# Patient Record
Sex: FEMALE | Race: WHITE | NOT HISPANIC OR LATINO
[De-identification: names, ages, dates, MRNs, and addresses within clinical notes are randomized per-mention and may not be internally consistent; named-entity substitution may affect disease eponyms.]

---

## 2017-01-09 ENCOUNTER — APPOINTMENT (OUTPATIENT)
Dept: SURGERY | Facility: CLINIC | Age: 57
End: 2017-01-09

## 2017-01-09 VITALS
DIASTOLIC BLOOD PRESSURE: 79 MMHG | HEART RATE: 81 BPM | HEIGHT: 68 IN | SYSTOLIC BLOOD PRESSURE: 128 MMHG | WEIGHT: 293 LBS | OXYGEN SATURATION: 95 % | BODY MASS INDEX: 44.41 KG/M2

## 2017-03-08 ENCOUNTER — RX RENEWAL (OUTPATIENT)
Age: 57
End: 2017-03-08

## 2017-03-16 ENCOUNTER — RX RENEWAL (OUTPATIENT)
Age: 57
End: 2017-03-16

## 2017-04-10 ENCOUNTER — APPOINTMENT (OUTPATIENT)
Dept: SURGERY | Facility: CLINIC | Age: 57
End: 2017-04-10

## 2017-04-10 VITALS
BODY MASS INDEX: 45.36 KG/M2 | SYSTOLIC BLOOD PRESSURE: 138 MMHG | WEIGHT: 289 LBS | HEIGHT: 67 IN | HEART RATE: 90 BPM | DIASTOLIC BLOOD PRESSURE: 80 MMHG | TEMPERATURE: 98.2 F | RESPIRATION RATE: 16 BRPM | OXYGEN SATURATION: 96 %

## 2017-06-12 ENCOUNTER — APPOINTMENT (OUTPATIENT)
Dept: PULMONOLOGY | Facility: CLINIC | Age: 57
End: 2017-06-12
Payer: COMMERCIAL

## 2017-06-12 VITALS
HEIGHT: 67 IN | SYSTOLIC BLOOD PRESSURE: 118 MMHG | RESPIRATION RATE: 18 BRPM | OXYGEN SATURATION: 87 % | DIASTOLIC BLOOD PRESSURE: 78 MMHG | BODY MASS INDEX: 44.1 KG/M2 | HEART RATE: 75 BPM | WEIGHT: 281 LBS

## 2017-06-12 PROCEDURE — 99215 OFFICE O/P EST HI 40 MIN: CPT

## 2017-09-25 ENCOUNTER — APPOINTMENT (OUTPATIENT)
Dept: PULMONOLOGY | Facility: CLINIC | Age: 57
End: 2017-09-25
Payer: COMMERCIAL

## 2017-09-25 VITALS
RESPIRATION RATE: 18 BRPM | OXYGEN SATURATION: 93 % | HEART RATE: 74 BPM | BODY MASS INDEX: 41.91 KG/M2 | SYSTOLIC BLOOD PRESSURE: 118 MMHG | HEIGHT: 67 IN | WEIGHT: 267 LBS | DIASTOLIC BLOOD PRESSURE: 80 MMHG

## 2017-09-25 PROCEDURE — 99214 OFFICE O/P EST MOD 30 MIN: CPT

## 2018-03-26 ENCOUNTER — APPOINTMENT (OUTPATIENT)
Dept: PULMONOLOGY | Facility: CLINIC | Age: 58
End: 2018-03-26

## 2019-07-26 ENCOUNTER — APPOINTMENT (OUTPATIENT)
Dept: PULMONOLOGY | Facility: CLINIC | Age: 59
End: 2019-07-26

## 2019-09-25 ENCOUNTER — APPOINTMENT (OUTPATIENT)
Dept: PULMONOLOGY | Facility: CLINIC | Age: 59
End: 2019-09-25
Payer: COMMERCIAL

## 2019-09-25 VITALS
WEIGHT: 293 LBS | BODY MASS INDEX: 46.36 KG/M2 | OXYGEN SATURATION: 86 % | DIASTOLIC BLOOD PRESSURE: 88 MMHG | TEMPERATURE: 98.3 F | HEART RATE: 94 BPM | RESPIRATION RATE: 16 BRPM | SYSTOLIC BLOOD PRESSURE: 122 MMHG

## 2019-09-25 VITALS — OXYGEN SATURATION: 95 %

## 2019-09-25 PROCEDURE — 90686 IIV4 VACC NO PRSV 0.5 ML IM: CPT

## 2019-09-25 PROCEDURE — 94729 DIFFUSING CAPACITY: CPT

## 2019-09-25 PROCEDURE — 99215 OFFICE O/P EST HI 40 MIN: CPT | Mod: 25

## 2019-09-25 PROCEDURE — G0008: CPT

## 2019-09-25 PROCEDURE — 94060 EVALUATION OF WHEEZING: CPT

## 2019-09-25 PROCEDURE — 94727 GAS DIL/WSHOT DETER LNG VOL: CPT

## 2019-09-25 NOTE — HISTORY OF PRESENT ILLNESS
[FreeTextEntry1] : She came for a follow up today. No cough, wheezing or shortness of breath at rest. \par \par She feels congested at times. She is not using any inhalers. \par \par Has not smoked since 2012. \par \par

## 2019-09-25 NOTE — REVIEW OF SYSTEMS
[Hypertension] : ~T hypertension [Snoring] : snoring [Fatigue] : no fatigue [Fever] : no fever [Poor Appetite] : normal appetite  [Nasal Congestion] : no nasal congestion [Ear Disturbance] : no ear disturbance [Epistaxis] : no nosebleeds [Cough] : no cough [Postnasal Drip] : no postnasal drip [Sputum] : not coughing up ~M sputum [Dyspnea] : no dyspnea [Chest Discomfort] : no chest discomfort [PND] : no PND [Heartburn] : no heartburn [Reflux] : no reflux [Diarrhea] : no diarrhea [Depression] : no depression [Dysuria] : no dysuria [Hypersomnolence] : not sleeping much more than usual [Difficulty Initiating Sleep] : no difficulty falling asleep

## 2019-09-25 NOTE — PHYSICAL EXAM
[General Appearance - In No Acute Distress] : no acute distress [Normal Conjunctiva] : the conjunctiva exhibited no abnormalities [III] : III [Jugular Venous Distention Increased] : there was no jugular-venous distention [Heart Sounds] : normal S1 and S2 [Auscultation Breath Sounds / Voice Sounds] : lungs were clear to auscultation bilaterally [Bowel Sounds] : normal bowel sounds [Abnormal Walk] : normal gait [Nail Clubbing] : no clubbing of the fingernails [Cyanosis, Localized] : no localized cyanosis [Skin Turgor] : normal skin turgor [No Focal Deficits] : no focal deficits [] : no rash [Impaired Insight] : insight and judgment were intact [Oriented To Time, Place, And Person] : oriented to person, place, and time [FreeTextEntry1] : Abdomen remains obese

## 2019-09-25 NOTE — DISCUSSION/SUMMARY
[FreeTextEntry1] : 59 year-old woman with obesity, S/P gastric sleeve in the past, severe obstructive lung disease, restrictive lung disease and chronic ventilatory failure. She has been hospitalized in the past with hypercapnic respiratory failure. Polysomnography has demonstrated severe sleep-related desaturations which were rectified with non-invasive ventilation. She does not want to use nocturnal ventilation. \par \par Has not been able to lose weight and maintain it. Weight loss was again advised. \par \par Influenza vaccination given in the left arm. \par \par To continue with Symbicort and ProAir as needed. \par \par Chest x-ray requested. \par \par Advised to consider a sleep study. She will let my know. \par \par Follow up in three months.

## 2019-09-27 ENCOUNTER — TRANSCRIPTION ENCOUNTER (OUTPATIENT)
Age: 59
End: 2019-09-27

## 2020-03-25 ENCOUNTER — APPOINTMENT (OUTPATIENT)
Dept: PULMONOLOGY | Facility: CLINIC | Age: 60
End: 2020-03-25
Payer: COMMERCIAL

## 2020-03-25 VITALS
HEART RATE: 77 BPM | DIASTOLIC BLOOD PRESSURE: 98 MMHG | TEMPERATURE: 98.5 F | WEIGHT: 293 LBS | SYSTOLIC BLOOD PRESSURE: 148 MMHG | OXYGEN SATURATION: 90 % | BODY MASS INDEX: 47.46 KG/M2

## 2020-03-25 PROCEDURE — 99214 OFFICE O/P EST MOD 30 MIN: CPT

## 2020-03-25 NOTE — PHYSICAL EXAM
[III] : III [Jugular Venous Distention Increased] : there was no jugular-venous distention [Heart Sounds] : normal S1 and S2 [Auscultation Breath Sounds / Voice Sounds] : lungs were clear to auscultation bilaterally [Bowel Sounds] : normal bowel sounds [Abnormal Walk] : normal gait [Nail Clubbing] : no clubbing of the fingernails [Cyanosis, Localized] : no localized cyanosis [Skin Turgor] : normal skin turgor [] : no rash [No Focal Deficits] : no focal deficits [Oriented To Time, Place, And Person] : oriented to person, place, and time [Impaired Insight] : insight and judgment were intact [Well Groomed] : well groomed [FreeTextEntry1] : Obese [Erythema] : no erythema of the pharynx

## 2020-03-25 NOTE — DISCUSSION/SUMMARY
[FreeTextEntry1] : She is a 59 year-old woman with obesity, S/P gastric sleeve in the past, severe obstructive lung disease, restrictive lung disease and chronic ventilatory failure. She has been hospitalized in the past with hypercapnic respiratory failure. Polysomnography has demonstrated severe sleep-related desaturations which were rectified with non-invasive ventilation. She has not wanted to use nocturnal ventilation. Has not been able to lose weight and maintain it. Weight loss was again advised. \par \par Her COPD is stable. To continue with Symbicort and ProAir as needed. The medications were renewed. \par \par Former smoker. To consider LDCT. \par \par Follow up in 6 months.

## 2020-03-25 NOTE — REVIEW OF SYSTEMS
[Hypertension] : ~T hypertension [Snoring] : snoring [Fever] : no fever [Fatigue] : no fatigue [Poor Appetite] : normal appetite  [Ear Disturbance] : no ear disturbance [Nasal Congestion] : no nasal congestion [Epistaxis] : no nosebleeds [Postnasal Drip] : no postnasal drip [Cough] : no cough [Hemoptysis] : no hemoptysis [Dyspnea] : no dyspnea [Wheezing] : no wheezing [Chest Discomfort] : no chest discomfort [PND] : no PND [Heartburn] : no heartburn [Reflux] : no reflux [Diarrhea] : no diarrhea [Dysuria] : no dysuria [Depression] : no depression [Difficulty Initiating Sleep] : no difficulty falling asleep [Hypersomnolence] : not sleeping much more than usual

## 2020-03-25 NOTE — HISTORY OF PRESENT ILLNESS
[FreeTextEntry1] : She came for a follow up today. \par \par No complaint of a cough, wheezing or shortness of breath at rest. \par \par Has been sheltering in place. \par \par Has not smoked since 2012. \par

## 2020-03-25 NOTE — PROCEDURE
[FreeTextEntry1] : PFT 9/25/19: Severe obstruction noted. Mild response to bronchodilator. Mild restriction. Diffusion was moderately reduced.

## 2020-05-19 ENCOUNTER — RX RENEWAL (OUTPATIENT)
Age: 60
End: 2020-05-19

## 2020-09-30 ENCOUNTER — APPOINTMENT (OUTPATIENT)
Dept: PULMONOLOGY | Facility: CLINIC | Age: 60
End: 2020-09-30
Payer: COMMERCIAL

## 2020-09-30 VITALS
SYSTOLIC BLOOD PRESSURE: 134 MMHG | WEIGHT: 293 LBS | BODY MASS INDEX: 46.99 KG/M2 | OXYGEN SATURATION: 94 % | DIASTOLIC BLOOD PRESSURE: 94 MMHG | TEMPERATURE: 98.2 F | HEART RATE: 82 BPM

## 2020-09-30 PROCEDURE — 90686 IIV4 VACC NO PRSV 0.5 ML IM: CPT

## 2020-09-30 PROCEDURE — G0008: CPT

## 2020-09-30 PROCEDURE — 99214 OFFICE O/P EST MOD 30 MIN: CPT | Mod: 25

## 2020-09-30 NOTE — REVIEW OF SYSTEMS
[Hypertension] : ~T hypertension [Snoring] : snoring [Fever] : no fever [Chills] : no chills [Fatigue] : no fatigue [Poor Appetite] : normal appetite  [Ear Disturbance] : no ear disturbance [Nasal Congestion] : no nasal congestion [Epistaxis] : no nosebleeds [Postnasal Drip] : no postnasal drip [Cough] : no cough [Hemoptysis] : no hemoptysis [Dyspnea] : no dyspnea [Wheezing] : no wheezing [Chest Discomfort] : no chest discomfort [PND] : no PND [Heartburn] : no heartburn [Reflux] : no reflux [Diarrhea] : no diarrhea [Dysuria] : no dysuria [Depression] : no depression [Difficulty Initiating Sleep] : no difficulty falling asleep [Hypersomnolence] : not sleeping much more than usual

## 2020-09-30 NOTE — HISTORY OF PRESENT ILLNESS
[Former] : former [TextBox_4] : She came for a follow up today. \par \par No complaint of a cough, wheezing or shortness of breath at rest. \par \par Did not have LDCT as advised. \par \par Has not smoked since 2012. \par  [TextBox_13] : 2012

## 2020-09-30 NOTE — PHYSICAL EXAM
[Well Groomed] : well groomed [III] : III [Jugular Venous Distention Increased] : there was no jugular-venous distention [Heart Sounds] : normal S1 and S2 [Auscultation Breath Sounds / Voice Sounds] : lungs were clear to auscultation bilaterally [Abnormal Walk] : normal gait [Nail Clubbing] : no clubbing of the fingernails [Skin Turgor] : normal skin turgor [Oriented To Time, Place, And Person] : oriented to person, place, and time [Impaired Insight] : insight and judgment were intact [No Focal Deficits] : no focal deficits [] : no hepato-splenomegaly [FreeTextEntry1] : Obese [Erythema] : no erythema of the pharynx

## 2020-09-30 NOTE — DISCUSSION/SUMMARY
[FreeTextEntry1] : She is a 60 year-old woman with obesity, S/P gastric sleeve in the past, severe obstructive lung disease, restrictive lung disease and chronic ventilatory failure. She has been hospitalized in the past with hypercapnic respiratory failure. Polysomnography has demonstrated severe sleep-related desaturations which were rectified with non-invasive ventilation. She has not wanted to use nocturnal ventilation. Has not been able to lose weight and maintain it.\par \par Her COPD is stable. To continue with Symbicort and ProAir as needed.Former smoker. \par \par LDCT discussed. She will let me know if she wants it. \par \par High dose influenza vaccination given. \par \par Follow up with PCP regarding her blood pressure management. \par \par Follow up in six months. Sooner if necessary.

## 2020-10-26 DIAGNOSIS — Z23 ENCOUNTER FOR IMMUNIZATION: ICD-10-CM

## 2020-10-29 ENCOUNTER — MED ADMIN CHARGE (OUTPATIENT)
Age: 60
End: 2020-10-29

## 2021-03-14 ENCOUNTER — INPATIENT (INPATIENT)
Facility: HOSPITAL | Age: 61
LOS: 5 days | Discharge: HOME HEALTH | DRG: 177 | End: 2021-03-20
Attending: INTERNAL MEDICINE | Admitting: INTERNAL MEDICINE
Payer: COMMERCIAL

## 2021-03-14 VITALS
WEIGHT: 289.91 LBS | HEIGHT: 68 IN | TEMPERATURE: 99 F | HEART RATE: 83 BPM | OXYGEN SATURATION: 89 % | SYSTOLIC BLOOD PRESSURE: 115 MMHG | DIASTOLIC BLOOD PRESSURE: 75 MMHG | RESPIRATION RATE: 24 BRPM

## 2021-03-14 DIAGNOSIS — Z90.49 ACQUIRED ABSENCE OF OTHER SPECIFIED PARTS OF DIGESTIVE TRACT: Chronic | ICD-10-CM

## 2021-03-14 DIAGNOSIS — U07.1 COVID-19: ICD-10-CM

## 2021-03-14 DIAGNOSIS — Z90.89 ACQUIRED ABSENCE OF OTHER ORGANS: Chronic | ICD-10-CM

## 2021-03-14 LAB
ALBUMIN SERPL ELPH-MCNC: 3.3 G/DL — SIGNIFICANT CHANGE UP (ref 3.3–5)
ALP SERPL-CCNC: 60 U/L — SIGNIFICANT CHANGE UP (ref 40–120)
ALT FLD-CCNC: 11 U/L — SIGNIFICANT CHANGE UP (ref 10–45)
ANION GAP SERPL CALC-SCNC: 11 MMOL/L — SIGNIFICANT CHANGE UP (ref 5–17)
APTT BLD: 36.9 SEC — HIGH (ref 27.5–35.5)
AST SERPL-CCNC: 18 U/L — SIGNIFICANT CHANGE UP (ref 10–40)
BASOPHILS # BLD AUTO: 0.01 K/UL — SIGNIFICANT CHANGE UP (ref 0–0.2)
BASOPHILS NFR BLD AUTO: 0.2 % — SIGNIFICANT CHANGE UP (ref 0–2)
BILIRUB SERPL-MCNC: 0.2 MG/DL — SIGNIFICANT CHANGE UP (ref 0.2–1.2)
BUN SERPL-MCNC: 12 MG/DL — SIGNIFICANT CHANGE UP (ref 7–23)
CALCIUM SERPL-MCNC: 8.4 MG/DL — SIGNIFICANT CHANGE UP (ref 8.4–10.5)
CHLORIDE SERPL-SCNC: 99 MMOL/L — SIGNIFICANT CHANGE UP (ref 96–108)
CO2 SERPL-SCNC: 28 MMOL/L — SIGNIFICANT CHANGE UP (ref 22–31)
CREAT SERPL-MCNC: 0.7 MG/DL — SIGNIFICANT CHANGE UP (ref 0.5–1.3)
CRP SERPL-MCNC: 3.07 MG/DL — HIGH (ref 0–0.4)
D DIMER BLD IA.RAPID-MCNC: 383 NG/ML DDU — HIGH
EOSINOPHIL # BLD AUTO: 0 K/UL — SIGNIFICANT CHANGE UP (ref 0–0.5)
EOSINOPHIL NFR BLD AUTO: 0 % — SIGNIFICANT CHANGE UP (ref 0–6)
FERRITIN SERPL-MCNC: 62 NG/ML — SIGNIFICANT CHANGE UP (ref 15–150)
GLUCOSE SERPL-MCNC: 100 MG/DL — HIGH (ref 70–99)
HCT VFR BLD CALC: 41.4 % — SIGNIFICANT CHANGE UP (ref 34.5–45)
HGB BLD-MCNC: 13 G/DL — SIGNIFICANT CHANGE UP (ref 11.5–15.5)
IMM GRANULOCYTES NFR BLD AUTO: 0.2 % — SIGNIFICANT CHANGE UP (ref 0–1.5)
INR BLD: 1.15 RATIO — SIGNIFICANT CHANGE UP (ref 0.88–1.16)
LYMPHOCYTES # BLD AUTO: 1.43 K/UL — SIGNIFICANT CHANGE UP (ref 1–3.3)
LYMPHOCYTES # BLD AUTO: 27.9 % — SIGNIFICANT CHANGE UP (ref 13–44)
MCHC RBC-ENTMCNC: 28.1 PG — SIGNIFICANT CHANGE UP (ref 27–34)
MCHC RBC-ENTMCNC: 31.4 GM/DL — LOW (ref 32–36)
MCV RBC AUTO: 89.4 FL — SIGNIFICANT CHANGE UP (ref 80–100)
MONOCYTES # BLD AUTO: 0.44 K/UL — SIGNIFICANT CHANGE UP (ref 0–0.9)
MONOCYTES NFR BLD AUTO: 8.6 % — SIGNIFICANT CHANGE UP (ref 2–14)
NEUTROPHILS # BLD AUTO: 3.23 K/UL — SIGNIFICANT CHANGE UP (ref 1.8–7.4)
NEUTROPHILS NFR BLD AUTO: 63.1 % — SIGNIFICANT CHANGE UP (ref 43–77)
NRBC # BLD: 0 /100 WBCS — SIGNIFICANT CHANGE UP (ref 0–0)
PLATELET # BLD AUTO: 141 K/UL — LOW (ref 150–400)
POTASSIUM SERPL-MCNC: 4 MMOL/L — SIGNIFICANT CHANGE UP (ref 3.5–5.3)
POTASSIUM SERPL-SCNC: 4 MMOL/L — SIGNIFICANT CHANGE UP (ref 3.5–5.3)
PROCALCITONIN SERPL-MCNC: 0.13 NG/ML — HIGH (ref 0.02–0.1)
PROT SERPL-MCNC: 6.9 G/DL — SIGNIFICANT CHANGE UP (ref 6–8.3)
PROTHROM AB SERPL-ACNC: 13.7 SEC — HIGH (ref 10.6–13.6)
RBC # BLD: 4.63 M/UL — SIGNIFICANT CHANGE UP (ref 3.8–5.2)
RBC # FLD: 12.8 % — SIGNIFICANT CHANGE UP (ref 10.3–14.5)
SARS-COV-2 RNA SPEC QL NAA+PROBE: DETECTED
SODIUM SERPL-SCNC: 138 MMOL/L — SIGNIFICANT CHANGE UP (ref 135–145)
WBC # BLD: 5.12 K/UL — SIGNIFICANT CHANGE UP (ref 3.8–10.5)
WBC # FLD AUTO: 5.12 K/UL — SIGNIFICANT CHANGE UP (ref 3.8–10.5)

## 2021-03-14 PROCEDURE — 99285 EMERGENCY DEPT VISIT HI MDM: CPT

## 2021-03-14 PROCEDURE — 99221 1ST HOSP IP/OBS SF/LOW 40: CPT

## 2021-03-14 PROCEDURE — 71045 X-RAY EXAM CHEST 1 VIEW: CPT | Mod: 26

## 2021-03-14 RX ORDER — ACETAMINOPHEN 500 MG
975 TABLET ORAL ONCE
Refills: 0 | Status: COMPLETED | OUTPATIENT
Start: 2021-03-14 | End: 2021-03-14

## 2021-03-14 RX ORDER — DEXAMETHASONE 0.5 MG/5ML
6 ELIXIR ORAL ONCE
Refills: 0 | Status: COMPLETED | OUTPATIENT
Start: 2021-03-14 | End: 2021-03-14

## 2021-03-14 RX ADMIN — Medication 975 MILLIGRAM(S): at 19:00

## 2021-03-14 RX ADMIN — Medication 6 MILLIGRAM(S): at 17:43

## 2021-03-14 RX ADMIN — Medication 975 MILLIGRAM(S): at 18:38

## 2021-03-14 NOTE — ED ADULT NURSE NOTE - OBJECTIVE STATEMENT
60 y.o A&Ox3 female presents to ED c/o SOB, body aches, and chills x 2 days. PMH COPD not on home O2, obesity, DVT, former smoker. Per pt SOB worsened over the day prompting her to come to ED. 89% on room air, tachypneic with respirations in high 20s. Edis LUBIN aware. Lungs diminished throughout, currently on 6L NC with O 2 saturation of 100%. Denies CP, n/v/d, abdominal pain, urinary symptoms,  numbness, tingling in upper and lower extremities, HA, blurry vision. Updated on plan of care.

## 2021-03-14 NOTE — H&P ADULT - NSICDXFAMILYHX_GEN_ALL_CORE_FT
FAMILY HISTORY:  Father  Still living? Unknown  Family history of lung cancer, Age at diagnosis: Age Unknown    Mother  Still living? Unknown  Maternal family history of hypertension, Age at diagnosis: Age Unknown

## 2021-03-14 NOTE — H&P ADULT - NSICDXPASTMEDICALHX_GEN_ALL_CORE_FT
PAST MEDICAL HISTORY:  Cellulitis b/l LE    Coma of unknown cause 2014    COPD (chronic obstructive pulmonary disease) intubated x 1- 08/2014    Deep vein thrombosis (DVT) of other vein of left lower extremity 2014, 01/2016- on Xarelto x 9 months    Morbid obesity due to excess calories     RENUKA treated with BiPAP sleep studied on 09/2016    Thyroid nodule

## 2021-03-14 NOTE — H&P ADULT - PROBLEM SELECTOR PLAN 4
Given current illness will continue O2 via NC for now. If breathing worsens or hypoxia appear primarily nocturnal would consider bipap use qhs

## 2021-03-14 NOTE — H&P ADULT - ASSESSMENT
61yo F w/pmhx of COPD, multiple DVTs of the LLE previously on AC but now discontinued, Obesity, RENUKA on BiPAP, who now presents with 3 day hx worsening shortness of breath and hypoxemia in the setting of recent COVID19 diagnosis

## 2021-03-14 NOTE — H&P ADULT - NSHPLABSRESULTS_GEN_ALL_CORE
Labs personally reviewed:                        13.0   5.12  )-----------( 141      ( 14 Mar 2021 18:14 )             41.4     03-14    138  |  99  |  12  ----------------------------<  100<H>  4.0   |  28  |  0.70    Ca    8.4      14 Mar 2021 18:14    TPro  6.9  /  Alb  3.3  /  TBili  0.2  /  DBili  x   /  AST  18  /  ALT  11  /  AlkPhos  60  03-14        LIVER FUNCTIONS - ( 14 Mar 2021 18:14 )  Alb: 3.3 g/dL / Pro: 6.9 g/dL / ALK PHOS: 60 U/L / ALT: 11 U/L / AST: 18 U/L / GGT: x           PT/INR - ( 14 Mar 2021 18:14 )   PT: 13.7 sec;   INR: 1.15 ratio         PTT - ( 14 Mar 2021 18:14 )  PTT:36.9 sec    Imaging personally reviewed-pneumonia on cxr

## 2021-03-14 NOTE — ED PROVIDER NOTE - PHYSICAL EXAMINATION
Exam:  General: Patient well appearing, vital signs within normal limits  HEENT: airway patent with moist mucous membranes  Cardiac: RRR S1/S2 with strong peripheral pulses  Respiratory: lungs clear without respiratory distress  GI: abdomen soft, non tender, non distended  Neuro: no gross neurologic deficits  Skin: warm, well perfused  Psych: normal mood and affect Exam:  General: Patient well appearing, vital signs within normal limits  HEENT: airway patent with moist mucous membranes  Cardiac: RRR S1/S2 with strong peripheral pulses  Respiratory: rales at bilateral bases, tachypneic, requiring 6L NC to maintain O2 sat on my evaluation  GI: abdomen soft, non tender, non distended  Neuro: no gross neurologic deficits  Skin: warm, well perfused  Psych: normal mood and affect

## 2021-03-14 NOTE — ED PROVIDER NOTE - OBJECTIVE STATEMENT
Patient reporting tested COVID-19 positive two days ago, daughter was ill first.  Feeling progressively short of breath at home, using home pulse ox and noting numbers in 80's so came to Emergency Department.  Reporting associated cough and low grade fevers.  History of COPD, former smoker quit 8 years ago.  Denying chest pains, abdominal pains, nausea and vomiting.

## 2021-03-14 NOTE — ED PROVIDER NOTE - ATTENDING CONTRIBUTION TO CARE
Patient seen and evaluated with resident/NP/PA, however HPI, ROS, PE and MDM as documented authored by myself unless otherwise noted- Topher Randhawa MD

## 2021-03-14 NOTE — ED ADULT NURSE NOTE - NSIMPLEMENTINTERV_GEN_ALL_ED
Implemented All Fall Risk Interventions:  [place] to call system. Call bell, personal items and telephone within reach. Instruct patient to call for assistance. Room bathroom lighting operational. Non-slip footwear when patient is off stretcher. Physically safe environment: no spills, clutter or unnecessary equipment. Stretcher in lowest position, wheels locked, appropriate side rails in place. Provide visual cue, wrist band, yellow gown, etc. Monitor gait and stability. Monitor for mental status changes and reorient to person, place, and time. Review medications for side effects contributing to fall risk. Reinforce activity limits and safety measures with patient and family.

## 2021-03-14 NOTE — H&P ADULT - PROBLEM SELECTOR PLAN 3
Given patient's history of multiple DVTs including when ill with respiratory issues in the past, current COVID19, use of decadron, obesity, patient is at very high risk for recurrent thrombosis.  Will thus place on full anticoagulation.  Recurrent DVTs may also justify longer term anticoagulation s/p discharge

## 2021-03-14 NOTE — ED ADULT NURSE REASSESSMENT NOTE - NS ED NURSE REASSESS COMMENT FT1
Report received from BHAVYA Molina at 1900 in [place]. VSS. Saturating 99% on 3L. Awaiting dispo.

## 2021-03-14 NOTE — H&P ADULT - NSICDXPASTSURGICALHX_GEN_ALL_CORE_FT
PAST SURGICAL HISTORY:  Status post cholecystectomy In 10/2015.    Status post tonsillectomy Childhood - age 4yo.

## 2021-03-14 NOTE — H&P ADULT - PROBLEM SELECTOR PLAN 1
Pt with acute hypoxemic respiratory failure 2/2 COVID19  s/p one dose of decadron in ER  continue decadron 6mg x 9 more days  start remdesivir  melatonin qhs for sleep quality  vit d, zinc  O2 supplementation via NC for now  Support for COPD as below Pt with acute hypoxemic respiratory failure 2/2 COVID19  s/p one dose of decadron in ER  continue decadron 6mg x 9 more days  start remdesivir  melatonin qhs for sleep quality  famotidine for gi ppx while on high dose decadron  vit d, zinc  O2 supplementation via NC for now  Support for COPD as below

## 2021-03-14 NOTE — H&P ADULT - NSHPPHYSICALEXAM_GEN_ALL_CORE
Vital Signs Last 24 Hrs  T(C): 36.7 (14 Mar 2021 23:27), Max: 38 (14 Mar 2021 18:02)  T(F): 98.1 (14 Mar 2021 23:27), Max: 100.4 (14 Mar 2021 18:02)  HR: 72 (14 Mar 2021 23:27) (72 - 83)  BP: 126/79 (14 Mar 2021 23:27) (115/75 - 150/82)  BP(mean): 94 (14 Mar 2021 23:27) (94 - 94)  RR: 19 (14 Mar 2021 23:27) (19 - 24)  SpO2: 97% (14 Mar 2021 23:27) (89% - 100%)    GENERAL: No acute distress, obesity  HEAD:  Atraumatic, Normocephalic  EYES: EOMI, PERRLA, conjunctiva and sclera clear  ENT: Oral mucosa moist  NECK: Neck supple  CHEST/LUNG: Decreased air movement, no wheezing, no rales  HEART: Regular rate and rhythm; No murmurs, rubs, or gallops  ABDOMEN: Soft, Nontender, Nondistended; Bowel sounds present  EXTREMITIES:  No clubbing, cyanosis. Chronic venous stasis changes of the LLE are noted.   VASCULAR: Posterior tibialis pulses intact bilaterally  PSYCH: Normal behavior, normal affect  NEUROLOGY: AAOx3  SKIN: grossly warm and dry

## 2021-03-14 NOTE — ED PROVIDER NOTE - PMH
Cellulitis  b/l LE  Coma of unknown cause  2014  COPD (chronic obstructive pulmonary disease)  intubated x 1- 08/2014  Deep vein thrombosis (DVT) of other vein of left lower extremity  2014, 01/2016- on Xarelto x 9 months  Morbid obesity due to excess calories    RENUKA treated with BiPAP  sleep studied on 09/2016  Thyroid nodule

## 2021-03-14 NOTE — H&P ADULT - HISTORY OF PRESENT ILLNESS
Pt is a 61yo F w/pmhx of COPD, DVT previously on AC, Obesity, RENUKA on BiPAP, who now presents with worsening shortness of breath and hypoxemia in the setting of recent COVID19 x 2 days.  Pt is a 61yo F w/pmhx of COPD, multiple DVTs of the LLE previously on AC but now discontinued, Obesity, RENUKA on BiPAP, who now presents with 3 day hx worsening shortness of breath and hypoxemia in the setting of recent COVID19 diagnosis (tested positive two days ago). Patient reports that her daughter recently got sick and was dx'ed with COVID19. Then about 3 days ago she began to notice a cough and significant fatigue/weakness associated with fever up to 100.xF. Patient went for COVID19 testing 2 days ago and tested positive. Because of her COPD patient regularly tests her pulse ox with a home pulse oximeter and reports that when she is well she runs in the high 90s. However, after falling ill patient noticed that with exertion (walking) she would feel very short of breath over the last two days and began to feel lightheaded. She then tested her pulse ox and reports that it had dropped as low as the mid 60s today. Patient began to feel very concerned about this and for that reason came to the hospital for help. No diarrhea. No myalgias. Cough is productive of clear sputum only.

## 2021-03-15 DIAGNOSIS — U07.1 COVID-19: ICD-10-CM

## 2021-03-15 DIAGNOSIS — J44.9 CHRONIC OBSTRUCTIVE PULMONARY DISEASE, UNSPECIFIED: ICD-10-CM

## 2021-03-15 DIAGNOSIS — G47.33 OBSTRUCTIVE SLEEP APNEA (ADULT) (PEDIATRIC): ICD-10-CM

## 2021-03-15 DIAGNOSIS — Z86.718 PERSONAL HISTORY OF OTHER VENOUS THROMBOSIS AND EMBOLISM: ICD-10-CM

## 2021-03-15 LAB
ALBUMIN SERPL ELPH-MCNC: 2.9 G/DL — LOW (ref 3.3–5)
ALP SERPL-CCNC: 57 U/L — SIGNIFICANT CHANGE UP (ref 40–120)
ALT FLD-CCNC: 10 U/L — SIGNIFICANT CHANGE UP (ref 10–45)
ANION GAP SERPL CALC-SCNC: 12 MMOL/L — SIGNIFICANT CHANGE UP (ref 5–17)
AST SERPL-CCNC: 16 U/L — SIGNIFICANT CHANGE UP (ref 10–40)
BASOPHILS # BLD AUTO: 0 K/UL — SIGNIFICANT CHANGE UP (ref 0–0.2)
BASOPHILS NFR BLD AUTO: 0 % — SIGNIFICANT CHANGE UP (ref 0–2)
BILIRUB SERPL-MCNC: 0.1 MG/DL — LOW (ref 0.2–1.2)
BUN SERPL-MCNC: 13 MG/DL — SIGNIFICANT CHANGE UP (ref 7–23)
CALCIUM SERPL-MCNC: 8.4 MG/DL — SIGNIFICANT CHANGE UP (ref 8.4–10.5)
CHLORIDE SERPL-SCNC: 99 MMOL/L — SIGNIFICANT CHANGE UP (ref 96–108)
CO2 SERPL-SCNC: 27 MMOL/L — SIGNIFICANT CHANGE UP (ref 22–31)
CREAT SERPL-MCNC: 0.56 MG/DL — SIGNIFICANT CHANGE UP (ref 0.5–1.3)
EOSINOPHIL # BLD AUTO: 0 K/UL — SIGNIFICANT CHANGE UP (ref 0–0.5)
EOSINOPHIL NFR BLD AUTO: 0 % — SIGNIFICANT CHANGE UP (ref 0–6)
GLUCOSE SERPL-MCNC: 117 MG/DL — HIGH (ref 70–99)
HCT VFR BLD CALC: 41.8 % — SIGNIFICANT CHANGE UP (ref 34.5–45)
HCV AB S/CO SERPL IA: 0.1 S/CO — SIGNIFICANT CHANGE UP (ref 0–0.99)
HCV AB SERPL-IMP: SIGNIFICANT CHANGE UP
HGB BLD-MCNC: 12.9 G/DL — SIGNIFICANT CHANGE UP (ref 11.5–15.5)
IMM GRANULOCYTES NFR BLD AUTO: 0.8 % — SIGNIFICANT CHANGE UP (ref 0–1.5)
INR BLD: 1.08 RATIO — SIGNIFICANT CHANGE UP (ref 0.88–1.16)
LYMPHOCYTES # BLD AUTO: 0.62 K/UL — LOW (ref 1–3.3)
LYMPHOCYTES # BLD AUTO: 16.8 % — SIGNIFICANT CHANGE UP (ref 13–44)
MCHC RBC-ENTMCNC: 27.7 PG — SIGNIFICANT CHANGE UP (ref 27–34)
MCHC RBC-ENTMCNC: 30.9 GM/DL — LOW (ref 32–36)
MCV RBC AUTO: 89.7 FL — SIGNIFICANT CHANGE UP (ref 80–100)
MONOCYTES # BLD AUTO: 0.26 K/UL — SIGNIFICANT CHANGE UP (ref 0–0.9)
MONOCYTES NFR BLD AUTO: 7.1 % — SIGNIFICANT CHANGE UP (ref 2–14)
NEUTROPHILS # BLD AUTO: 2.77 K/UL — SIGNIFICANT CHANGE UP (ref 1.8–7.4)
NEUTROPHILS NFR BLD AUTO: 75.3 % — SIGNIFICANT CHANGE UP (ref 43–77)
NRBC # BLD: 0 /100 WBCS — SIGNIFICANT CHANGE UP (ref 0–0)
PLATELET # BLD AUTO: 128 K/UL — LOW (ref 150–400)
POTASSIUM SERPL-MCNC: 4.4 MMOL/L — SIGNIFICANT CHANGE UP (ref 3.5–5.3)
POTASSIUM SERPL-SCNC: 4.4 MMOL/L — SIGNIFICANT CHANGE UP (ref 3.5–5.3)
PROT SERPL-MCNC: 6.6 G/DL — SIGNIFICANT CHANGE UP (ref 6–8.3)
PROTHROM AB SERPL-ACNC: 12.8 SEC — SIGNIFICANT CHANGE UP (ref 10.6–13.6)
RBC # BLD: 4.66 M/UL — SIGNIFICANT CHANGE UP (ref 3.8–5.2)
RBC # FLD: 12.6 % — SIGNIFICANT CHANGE UP (ref 10.3–14.5)
SODIUM SERPL-SCNC: 138 MMOL/L — SIGNIFICANT CHANGE UP (ref 135–145)
WBC # BLD: 3.68 K/UL — LOW (ref 3.8–10.5)
WBC # FLD AUTO: 3.68 K/UL — LOW (ref 3.8–10.5)

## 2021-03-15 RX ORDER — ENOXAPARIN SODIUM 100 MG/ML
130 INJECTION SUBCUTANEOUS
Refills: 0 | Status: DISCONTINUED | OUTPATIENT
Start: 2021-03-15 | End: 2021-03-17

## 2021-03-15 RX ORDER — DEXAMETHASONE 0.5 MG/5ML
6 ELIXIR ORAL DAILY
Refills: 0 | Status: DISCONTINUED | OUTPATIENT
Start: 2021-03-15 | End: 2021-03-20

## 2021-03-15 RX ORDER — REMDESIVIR 5 MG/ML
100 INJECTION INTRAVENOUS EVERY 24 HOURS
Refills: 0 | Status: COMPLETED | OUTPATIENT
Start: 2021-03-16 | End: 2021-03-19

## 2021-03-15 RX ORDER — CHOLECALCIFEROL (VITAMIN D3) 125 MCG
5000 CAPSULE ORAL DAILY
Refills: 0 | Status: DISCONTINUED | OUTPATIENT
Start: 2021-03-15 | End: 2021-03-20

## 2021-03-15 RX ORDER — BUDESONIDE AND FORMOTEROL FUMARATE DIHYDRATE 160; 4.5 UG/1; UG/1
2 AEROSOL RESPIRATORY (INHALATION)
Refills: 0 | Status: DISCONTINUED | OUTPATIENT
Start: 2021-03-15 | End: 2021-03-20

## 2021-03-15 RX ORDER — FAMOTIDINE 10 MG/ML
20 INJECTION INTRAVENOUS DAILY
Refills: 0 | Status: DISCONTINUED | OUTPATIENT
Start: 2021-03-15 | End: 2021-03-20

## 2021-03-15 RX ORDER — GUAIFENESIN/DEXTROMETHORPHAN 600MG-30MG
10 TABLET, EXTENDED RELEASE 12 HR ORAL EVERY 4 HOURS
Refills: 0 | Status: DISCONTINUED | OUTPATIENT
Start: 2021-03-15 | End: 2021-03-20

## 2021-03-15 RX ORDER — ZINC SULFATE TAB 220 MG (50 MG ZINC EQUIVALENT) 220 (50 ZN) MG
220 TAB ORAL DAILY
Refills: 0 | Status: DISCONTINUED | OUTPATIENT
Start: 2021-03-15 | End: 2021-03-20

## 2021-03-15 RX ORDER — ALBUTEROL 90 UG/1
2 AEROSOL, METERED ORAL EVERY 6 HOURS
Refills: 0 | Status: DISCONTINUED | OUTPATIENT
Start: 2021-03-15 | End: 2021-03-20

## 2021-03-15 RX ORDER — REMDESIVIR 5 MG/ML
200 INJECTION INTRAVENOUS EVERY 24 HOURS
Refills: 0 | Status: COMPLETED | OUTPATIENT
Start: 2021-03-15 | End: 2021-03-15

## 2021-03-15 RX ORDER — REMDESIVIR 5 MG/ML
INJECTION INTRAVENOUS
Refills: 0 | Status: COMPLETED | OUTPATIENT
Start: 2021-03-15 | End: 2021-03-19

## 2021-03-15 RX ORDER — ACETAMINOPHEN 500 MG
650 TABLET ORAL EVERY 4 HOURS
Refills: 0 | Status: DISCONTINUED | OUTPATIENT
Start: 2021-03-15 | End: 2021-03-20

## 2021-03-15 RX ORDER — LANOLIN ALCOHOL/MO/W.PET/CERES
3 CREAM (GRAM) TOPICAL AT BEDTIME
Refills: 0 | Status: DISCONTINUED | OUTPATIENT
Start: 2021-03-15 | End: 2021-03-20

## 2021-03-15 RX ADMIN — Medication 5000 UNIT(S): at 11:39

## 2021-03-15 RX ADMIN — Medication 100 MILLIGRAM(S): at 21:26

## 2021-03-15 RX ADMIN — ENOXAPARIN SODIUM 130 MILLIGRAM(S): 100 INJECTION SUBCUTANEOUS at 00:00

## 2021-03-15 RX ADMIN — BUDESONIDE AND FORMOTEROL FUMARATE DIHYDRATE 2 PUFF(S): 160; 4.5 AEROSOL RESPIRATORY (INHALATION) at 08:58

## 2021-03-15 RX ADMIN — Medication 6 MILLIGRAM(S): at 11:39

## 2021-03-15 RX ADMIN — REMDESIVIR 500 MILLIGRAM(S): 5 INJECTION INTRAVENOUS at 09:48

## 2021-03-15 RX ADMIN — FAMOTIDINE 20 MILLIGRAM(S): 10 INJECTION INTRAVENOUS at 11:40

## 2021-03-15 RX ADMIN — Medication 100 MILLIGRAM(S): at 13:12

## 2021-03-15 RX ADMIN — ZINC SULFATE TAB 220 MG (50 MG ZINC EQUIVALENT) 220 MILLIGRAM(S): 220 (50 ZN) TAB at 11:40

## 2021-03-15 RX ADMIN — ENOXAPARIN SODIUM 130 MILLIGRAM(S): 100 INJECTION SUBCUTANEOUS at 17:25

## 2021-03-15 RX ADMIN — ZINC SULFATE TAB 220 MG (50 MG ZINC EQUIVALENT) 220 MILLIGRAM(S): 220 (50 ZN) TAB at 00:00

## 2021-03-15 RX ADMIN — BUDESONIDE AND FORMOTEROL FUMARATE DIHYDRATE 2 PUFF(S): 160; 4.5 AEROSOL RESPIRATORY (INHALATION) at 21:27

## 2021-03-15 RX ADMIN — Medication 3 MILLIGRAM(S): at 21:26

## 2021-03-15 NOTE — PROGRESS NOTE ADULT - PROBLEM SELECTOR PLAN 1
Pt with acute hypoxemic respiratory failure 2/2 COVID19  s/p one dose of decadron in ER  continue decadron 6mg x 9 more days  start remdesivir  melatonin qhs for sleep quality  famotidine for gi ppx while on high dose decadron  vit d, zinc  O2 supplementation via NC for now  Support for COPD as below Pt with acute hypoxemic respiratory failure 2/2 COVID19. s/p one dose of decadron in ER. Continue decadron 6mg x 9 more days, agree with IV Remdizivir.   Will do CTA tomorrow, continue Lovenox 130 mg bid. Pt with acute hypoxemic respiratory failure 2/2 COVID19. s/p one dose of decadron in ER. Continue decadron 6mg x 9 more days, agree with IV Remdizivir for 5 day course. Will do CTA tomorrow, continue Lovenox 130 mg bid. Morbid obesity is significant risk factor. Pt with acute hypoxemic respiratory failure 2/2 COVID19. s/p one dose of decadron in ER. Continue decadron 6mg x 9 more days, agree with IV Remdizivir for 5 day course. Will do CTA tomorrow, continue Lovenox 130 mg bid. Morbid obesity is significant risk factor. 130 kg is weight.

## 2021-03-15 NOTE — PROGRESS NOTE ADULT - PROBLEM SELECTOR PLAN 3
Given patient's history of multiple DVTs including when ill with respiratory issues in the past, current COVID19, use of decadron, obesity, patient is at very high risk for recurrent thrombosis.  Will thus place on full anticoagulation.  Recurrent DVTs may also justify longer term anticoagulation s/p discharge Given patient's history of multiple DVTs including when ill with respiratory issues in the past, current COVID19, use of decadron, obesity, patient is at very high risk for recurrent thrombosis. Will thus place on full anticoagulation.  Recurrent DVTs may also justify longer term anticoagulation s/p discharge.   As above will do CTA in AM.

## 2021-03-15 NOTE — PROGRESS NOTE ADULT - PROBLEM SELECTOR PLAN 4
Given current illness will continue O2 via NC for now. If breathing worsens or hypoxia appear primarily nocturnal would consider bipap use qhs Given current illness will continue O2 via NC for now. If breathing worsens or hypoxia appear primarily nocturnal would consider bipap use qhs.   Asked Pulmonary Dr. Horan and group to follow.

## 2021-03-15 NOTE — PROGRESS NOTE ADULT - SUBJECTIVE AND OBJECTIVE BOX
INTERVAL HPI/OVERNIGHT EVENTS:  Pt seen and examined at bedside.     Allergies/Intolerance: No Known Allergies      MEDICATIONS  (STANDING):  benzonatate 100 milliGRAM(s) Oral every 8 hours  budesonide 160 MICROgram(s)/formoterol 4.5 MICROgram(s) Inhaler 2 Puff(s) Inhalation two times a day  cholecalciferol 5000 Unit(s) Oral daily  dexAMETHasone     Tablet 6 milliGRAM(s) Oral daily  enoxaparin Injectable 130 milliGRAM(s) SubCutaneous two times a day  famotidine    Tablet 20 milliGRAM(s) Oral daily  melatonin 3 milliGRAM(s) Oral at bedtime  remdesivir  IVPB   IV Intermittent   zinc sulfate 220 milliGRAM(s) Oral daily    MEDICATIONS  (PRN):  acetaminophen   Tablet .. 650 milliGRAM(s) Oral every 4 hours PRN Temp greater or equal to 38.5C (101.3F)  ALBUTerol    90 MICROgram(s) HFA Inhaler 2 Puff(s) Inhalation every 6 hours PRN Shortness of Breath and/or Wheezing  guaifenesin/dextromethorphan  Syrup 10 milliLiter(s) Oral every 4 hours PRN Cough        ROS: all systems reviewed and wnl      PHYSICAL EXAMINATION:  Vital Signs Last 24 Hrs  T(C): 37.5 (15 Mar 2021 05:50), Max: 38 (14 Mar 2021 18:02)  T(F): 99.5 (15 Mar 2021 05:50), Max: 100.4 (14 Mar 2021 18:02)  HR: 79 (15 Mar 2021 05:50) (72 - 83)  BP: 149/82 (15 Mar 2021 05:50) (115/75 - 150/82)  BP(mean): 94 (14 Mar 2021 23:27) (94 - 94)  RR: 19 (15 Mar 2021 05:50) (19 - 24)  SpO2: 93% (15 Mar 2021 05:50) (89% - 100%)  CAPILLARY BLOOD GLUCOSE          03-15 @ 07:01  -  03-15 @ 10:15  --------------------------------------------------------  IN: 250 mL / OUT: 0 mL / NET: 250 mL        GENERAL:   NECK: supple, No JVD  CHEST/LUNG: clear to auscultation bilaterally; no rales, rhonchi, or wheezing b/l  HEART: normal S1, S2  ABDOMEN: BS+, soft, ND, NT   EXTREMITIES:  pulses palpable; no clubbing, cyanosis, or edema b/l LEs  SKIN: no rashes or lesions      LABS:                        12.9   3.68  )-----------( 128      ( 15 Mar 2021 07:24 )             41.8     03-15    138  |  99  |  13  ----------------------------<  117<H>  4.4   |  27  |  0.56    Ca    8.4      15 Mar 2021 07:18    TPro  6.6  /  Alb  2.9<L>  /  TBili  0.1<L>  /  DBili  x   /  AST  16  /  ALT  10  /  AlkPhos  57  03-15    PT/INR - ( 15 Mar 2021 09:10 )   PT: 12.8 sec;   INR: 1.08 ratio         PTT - ( 14 Mar 2021 18:14 )  PTT:36.9 sec           INTERVAL HPI/OVERNIGHT EVENTS:  Pt seen and examined at bedside.     Allergies/Intolerance: No Known Allergies      MEDICATIONS  (STANDING):  benzonatate 100 milliGRAM(s) Oral every 8 hours  budesonide 160 MICROgram(s)/formoterol 4.5 MICROgram(s) Inhaler 2 Puff(s) Inhalation two times a day  cholecalciferol 5000 Unit(s) Oral daily  dexAMETHasone     Tablet 6 milliGRAM(s) Oral daily  enoxaparin Injectable 130 milliGRAM(s) SubCutaneous two times a day  famotidine    Tablet 20 milliGRAM(s) Oral daily  melatonin 3 milliGRAM(s) Oral at bedtime  remdesivir  IVPB   IV Intermittent   zinc sulfate 220 milliGRAM(s) Oral daily    MEDICATIONS  (PRN):  acetaminophen   Tablet .. 650 milliGRAM(s) Oral every 4 hours PRN Temp greater or equal to 38.5C (101.3F)  ALBUTerol    90 MICROgram(s) HFA Inhaler 2 Puff(s) Inhalation every 6 hours PRN Shortness of Breath and/or Wheezing  guaifenesin/dextromethorphan  Syrup 10 milliLiter(s) Oral every 4 hours PRN Cough        ROS: all systems reviewed and wnl      PHYSICAL EXAMINATION:  Vital Signs Last 24 Hrs  T(C): 37.5 (15 Mar 2021 05:50), Max: 38 (14 Mar 2021 18:02)  T(F): 99.5 (15 Mar 2021 05:50), Max: 100.4 (14 Mar 2021 18:02)  HR: 79 (15 Mar 2021 05:50) (72 - 83)  BP: 149/82 (15 Mar 2021 05:50) (115/75 - 150/82)  BP(mean): 94 (14 Mar 2021 23:27) (94 - 94)  RR: 19 (15 Mar 2021 05:50) (19 - 24)  SpO2: 93% (15 Mar 2021 05:50) (89% - 100%)  CAPILLARY BLOOD GLUCOSE          03-15 @ 07:01  -  03-15 @ 10:15  --------------------------------------------------------  IN: 250 mL / OUT: 0 mL / NET: 250 mL        GENERAL: stable, on NC 2.0 lpm, comfortable, no fevers or CP,   NECK: supple, No JVD  CHEST/LUNG: clear to auscultation bilaterally; no rales, rhonchi, or wheezing b/l  HEART: normal S1, S2  ABDOMEN: BS+, soft, ND, NT   EXTREMITIES:  pulses palpable; no clubbing, cyanosis, or edema b/l LEs  SKIN: no rashes or lesions      LABS:                        12.9   3.68  )-----------( 128      ( 15 Mar 2021 07:24 )             41.8     03-15    138  |  99  |  13  ----------------------------<  117<H>  4.4   |  27  |  0.56    Ca    8.4      15 Mar 2021 07:18    TPro  6.6  /  Alb  2.9<L>  /  TBili  0.1<L>  /  DBili  x   /  AST  16  /  ALT  10  /  AlkPhos  57  03-15    PT/INR - ( 15 Mar 2021 09:10 )   PT: 12.8 sec;   INR: 1.08 ratio         PTT - ( 14 Mar 2021 18:14 )  PTT:36.9 sec

## 2021-03-16 LAB
ALBUMIN SERPL ELPH-MCNC: 2.9 G/DL — LOW (ref 3.3–5)
ALP SERPL-CCNC: 52 U/L — SIGNIFICANT CHANGE UP (ref 40–120)
ALT FLD-CCNC: 11 U/L — SIGNIFICANT CHANGE UP (ref 10–45)
ANION GAP SERPL CALC-SCNC: 9 MMOL/L — SIGNIFICANT CHANGE UP (ref 5–17)
AST SERPL-CCNC: 17 U/L — SIGNIFICANT CHANGE UP (ref 10–40)
BILIRUB DIRECT SERPL-MCNC: <0.1 MG/DL — SIGNIFICANT CHANGE UP (ref 0–0.2)
BILIRUB INDIRECT FLD-MCNC: >0.1 MG/DL — LOW (ref 0.2–1)
BILIRUB SERPL-MCNC: 0.2 MG/DL — SIGNIFICANT CHANGE UP (ref 0.2–1.2)
BUN SERPL-MCNC: 20 MG/DL — SIGNIFICANT CHANGE UP (ref 7–23)
CALCIUM SERPL-MCNC: 8.6 MG/DL — SIGNIFICANT CHANGE UP (ref 8.4–10.5)
CHLORIDE SERPL-SCNC: 104 MMOL/L — SIGNIFICANT CHANGE UP (ref 96–108)
CO2 SERPL-SCNC: 28 MMOL/L — SIGNIFICANT CHANGE UP (ref 22–31)
CREAT SERPL-MCNC: 0.66 MG/DL — SIGNIFICANT CHANGE UP (ref 0.5–1.3)
GLUCOSE BLDC GLUCOMTR-MCNC: 115 MG/DL — HIGH (ref 70–99)
GLUCOSE BLDC GLUCOMTR-MCNC: 121 MG/DL — HIGH (ref 70–99)
GLUCOSE BLDC GLUCOMTR-MCNC: 124 MG/DL — HIGH (ref 70–99)
GLUCOSE SERPL-MCNC: 100 MG/DL — HIGH (ref 70–99)
HCT VFR BLD CALC: 41.5 % — SIGNIFICANT CHANGE UP (ref 34.5–45)
HGB BLD-MCNC: 12.8 G/DL — SIGNIFICANT CHANGE UP (ref 11.5–15.5)
INR BLD: 1.22 RATIO — HIGH (ref 0.88–1.16)
MAGNESIUM SERPL-MCNC: 2 MG/DL — SIGNIFICANT CHANGE UP (ref 1.6–2.6)
MCHC RBC-ENTMCNC: 27.7 PG — SIGNIFICANT CHANGE UP (ref 27–34)
MCHC RBC-ENTMCNC: 30.8 GM/DL — LOW (ref 32–36)
MCV RBC AUTO: 89.8 FL — SIGNIFICANT CHANGE UP (ref 80–100)
NRBC # BLD: 0 /100 WBCS — SIGNIFICANT CHANGE UP (ref 0–0)
PHOSPHATE SERPL-MCNC: 2.9 MG/DL — SIGNIFICANT CHANGE UP (ref 2.5–4.5)
PLATELET # BLD AUTO: 149 K/UL — LOW (ref 150–400)
POTASSIUM SERPL-MCNC: 4.3 MMOL/L — SIGNIFICANT CHANGE UP (ref 3.5–5.3)
POTASSIUM SERPL-SCNC: 4.3 MMOL/L — SIGNIFICANT CHANGE UP (ref 3.5–5.3)
PROT SERPL-MCNC: 6.4 G/DL — SIGNIFICANT CHANGE UP (ref 6–8.3)
PROTHROM AB SERPL-ACNC: 14.3 SEC — HIGH (ref 10.6–13.6)
RBC # BLD: 4.62 M/UL — SIGNIFICANT CHANGE UP (ref 3.8–5.2)
RBC # FLD: 12.5 % — SIGNIFICANT CHANGE UP (ref 10.3–14.5)
SODIUM SERPL-SCNC: 141 MMOL/L — SIGNIFICANT CHANGE UP (ref 135–145)
WBC # BLD: 5.87 K/UL — SIGNIFICANT CHANGE UP (ref 3.8–10.5)
WBC # FLD AUTO: 5.87 K/UL — SIGNIFICANT CHANGE UP (ref 3.8–10.5)

## 2021-03-16 PROCEDURE — 71275 CT ANGIOGRAPHY CHEST: CPT | Mod: 26

## 2021-03-16 PROCEDURE — 93970 EXTREMITY STUDY: CPT | Mod: 26

## 2021-03-16 PROCEDURE — 99223 1ST HOSP IP/OBS HIGH 75: CPT

## 2021-03-16 RX ADMIN — Medication 100 MILLIGRAM(S): at 22:33

## 2021-03-16 RX ADMIN — ZINC SULFATE TAB 220 MG (50 MG ZINC EQUIVALENT) 220 MILLIGRAM(S): 220 (50 ZN) TAB at 12:05

## 2021-03-16 RX ADMIN — Medication 100 MILLIGRAM(S): at 05:06

## 2021-03-16 RX ADMIN — Medication 6 MILLIGRAM(S): at 05:06

## 2021-03-16 RX ADMIN — BUDESONIDE AND FORMOTEROL FUMARATE DIHYDRATE 2 PUFF(S): 160; 4.5 AEROSOL RESPIRATORY (INHALATION) at 22:33

## 2021-03-16 RX ADMIN — REMDESIVIR 500 MILLIGRAM(S): 5 INJECTION INTRAVENOUS at 10:24

## 2021-03-16 RX ADMIN — BUDESONIDE AND FORMOTEROL FUMARATE DIHYDRATE 2 PUFF(S): 160; 4.5 AEROSOL RESPIRATORY (INHALATION) at 10:17

## 2021-03-16 RX ADMIN — ENOXAPARIN SODIUM 130 MILLIGRAM(S): 100 INJECTION SUBCUTANEOUS at 05:06

## 2021-03-16 RX ADMIN — Medication 100 MILLIGRAM(S): at 13:20

## 2021-03-16 RX ADMIN — ENOXAPARIN SODIUM 130 MILLIGRAM(S): 100 INJECTION SUBCUTANEOUS at 17:08

## 2021-03-16 RX ADMIN — FAMOTIDINE 20 MILLIGRAM(S): 10 INJECTION INTRAVENOUS at 12:05

## 2021-03-16 RX ADMIN — Medication 3 MILLIGRAM(S): at 22:33

## 2021-03-16 RX ADMIN — Medication 10 MILLILITER(S): at 12:06

## 2021-03-16 RX ADMIN — Medication 5000 UNIT(S): at 12:05

## 2021-03-16 NOTE — PROGRESS NOTE ADULT - PROBLEM SELECTOR PLAN 3
Given patient's history of multiple DVTs including when ill with respiratory issues in the past, current COVID19, use of decadron, obesity, patient is at very high risk for recurrent thrombosis. Will thus place on full anticoagulation.  Recurrent DVTs may also justify longer term anticoagulation s/p discharge.   As above will do CTA in AM. Given patient's history of multiple DVTs including when ill with respiratory issues in the past, current COVID19, use of decadron, obesity, patient is at very high risk for recurrent thrombosis. Will thus place on full anticoagulation.  Recurrent DVTs may also justify longer term anticoagulation s/p discharge.

## 2021-03-16 NOTE — CONSULT NOTE ADULT - SUBJECTIVE AND OBJECTIVE BOX
PULMONARY CONSULT NOTE      TORI PAREKH  MRN-[id number]    Patient is a 60y old  Female who presents with a chief complaint of Acute hypoxemic respiratory failure 2/2 COVID19 (16 Mar 2021 09:35)      HISTORY OF PRESENT ILLNESS:  61 yo female with PMH of morbid obesity, diagnosed with RENUKA in the past- self-discontinued NIVV, former smoker- on chronic inhalers, history of multiple DVTs of the LLE previously on AC but now discontinued, diagnosed with COVID on 3/12.  today on 1L - comfortable. some cough that has been unchanged since diagnosis. no wheezing.   feels fatigue/ weak.        PFT 9/25/19: Severe obstruction noted. Mild response to bronchodilator. Mild restriction. Diffusion was moderately reduced.         Allergies    No Known Allergies    Intolerances        PAST MEDICAL & SURGICAL HISTORY:  Thyroid nodule    Coma of unknown cause  2014    Morbid obesity due to excess calories    Deep vein thrombosis (DVT) of other vein of left lower extremity  2014, 01/2016- on Xarelto x 9 months    RENUKA treated with BiPAP  sleep studied on 09/2016    Cellulitis  b/l LE    COPD (chronic obstructive pulmonary disease)  intubated x 1- 08/2014    Status post tonsillectomy  Childhood - age 6yo.    Status post cholecystectomy  In 10/2015.            FAMILY HISTORY:  Family history of lung cancer (Father)  Father    Maternal family history of hypertension (Mother)  Mother      Prescriptions:      SOCIAL HISTORY  Smoking History: 30 pack year, quit 10 years ago    REVIEW OF SYSTEMS:  fatigue/chills  cough      Vital Signs Last 24 Hrs  T(C): 37.8 (16 Mar 2021 05:57), Max: 37.8 (16 Mar 2021 05:57)  T(F): 100 (16 Mar 2021 05:57), Max: 100 (16 Mar 2021 05:57)  HR: 82 (16 Mar 2021 05:57) (82 - 86)  BP: 151/81 (16 Mar 2021 05:57) (122/80 - 151/81)  BP(mean): --  RR: 18 (16 Mar 2021 05:57) (18 - 19)  SpO2: 92% (16 Mar 2021 05:57) (92% - 95%)    PHYSICAL EXAMINATION:    GENERAL: The patient is a  morbidly obese  nc/at  no resp distress on NC  regular rate  aaox3      MEDICATIONS  (STANDING):  benzonatate 100 milliGRAM(s) Oral every 8 hours  budesonide 160 MICROgram(s)/formoterol 4.5 MICROgram(s) Inhaler 2 Puff(s) Inhalation two times a day  cholecalciferol 5000 Unit(s) Oral daily  dexAMETHasone     Tablet 6 milliGRAM(s) Oral daily  enoxaparin Injectable 130 milliGRAM(s) SubCutaneous two times a day  famotidine    Tablet 20 milliGRAM(s) Oral daily  melatonin 3 milliGRAM(s) Oral at bedtime  remdesivir  IVPB   IV Intermittent   remdesivir  IVPB 100 milliGRAM(s) IV Intermittent every 24 hours  zinc sulfate 220 milliGRAM(s) Oral daily      MEDICATIONS  (PRN):  acetaminophen   Tablet .. 650 milliGRAM(s) Oral every 4 hours PRN Temp greater or equal to 38.5C (101.3F)  ALBUTerol    90 MICROgram(s) HFA Inhaler 2 Puff(s) Inhalation every 6 hours PRN Shortness of Breath and/or Wheezing  guaifenesin/dextromethorphan  Syrup 10 milliLiter(s) Oral every 4 hours PRN Cough        LABS:   CBC Full  -  ( 16 Mar 2021 06:07 )  WBC Count : 5.87 K/uL  RBC Count : 4.62 M/uL  Hemoglobin : 12.8 g/dL  Hematocrit : 41.5 %  Platelet Count - Automated : 149 K/uL  Mean Cell Volume : 89.8 fl  Mean Cell Hemoglobin : 27.7 pg  Mean Cell Hemoglobin Concentration : 30.8 gm/dL  Auto Neutrophil # : x  Auto Lymphocyte # : x  Auto Monocyte # : x  Auto Eosinophil # : x  Auto Basophil # : x  Auto Neutrophil % : x  Auto Lymphocyte % : x  Auto Monocyte % : x  Auto Eosinophil % : x  Auto Basophil % : x    PT/INR - ( 16 Mar 2021 08:29 )   PT: 14.3 sec;   INR: 1.22 ratio         PTT - ( 14 Mar 2021 18:14 )  PTT:36.9 sec  03-16    141  |  104  |  20  ----------------------------<  100<H>  4.3   |  28  |  0.66    Ca    8.6      16 Mar 2021 06:09  Phos  2.9     03-16  Mg     2.0     03-16    TPro  6.4  /  Alb  2.9<L>  /  TBili  0.2  /  DBili  <0.1  /  AST  17  /  ALT  11  /  AlkPhos  52  03-16           RADIOLOGY & ADDITIONAL STUDIES:  ra< from: Xray Chest 1 View- PORTABLE-Urgent (03.14.21 @ 18:22) >    EXAM:  XR CHEST PORTABLE URGENT 1V                            PROCEDURE DATE:  03/14/2021            INTERPRETATION:  CLINICAL INFORMATION: Dyspnea, cough, fever.    TECHNIQUE: Single portable view of the chest.    COMPARISON: CT scan of the chest from January 28, 2016.    FINDINGS:    Heart size and the mediastinum cannot be accurately evaluated on this projection.  Right-sided and mid superior mediastinal opacity with leftward tracheal deviation and narrowing is again seen consistent with thyroid enlargement.  The right lung is clear.  There is new diffuse patchy left lung opacity with sparing of the apex.  No pleural effusion or pneumothorax is seen.  No acute bony abnormality noted.    IMPRESSION:    Right-sided and mid superior mediastinal opacity with leftward tracheal deviation and narrowing again seen consistent with thyroid enlargement on the CT scan.    New diffuse patchy left lung opacity which can be consistent with pneumonia including an atypical/viral process such as COVID-19.              LIBBY MO MD; Resident Radiology  This document has been electronically signed.  DUANE HERNANDEZ MD; Attending Radiologist  This document has been electronically signed. Mar 15 2021  9:25AM    < end of copied text >    ECHO:  2016    ASSESSMENT:  61 yo with copd, former smoker, RENUKA not on therapy, history of VTE admitted for covid 19    PLAN:  on remdesivir/ dexamethasone protocol  on empiric treatment dose lovenox  she has ahistory of VTE- in our records- last seen Left 2016 DVT, PE negative  may benefit from imaging while in patient to help guide us on NOAC dosing upon discharge  continue her home inhalers  trial of RA when awake, she will likely need 1-2 L at night when sleeping for her Untreated RENUKA  trend markers    COVID precautions     Thank you for allowing me to participate in the care of this patient.  Please feel free to call me for any questions/concerns.      Lizzy Gomez DO  [facility] Pulmonary/Sleep Medicine  [phone]

## 2021-03-16 NOTE — PROGRESS NOTE ADULT - PROBLEM SELECTOR PLAN 1
Pt with acute hypoxemic respiratory failure 2/2 COVID19. s/p one dose of decadron in ER. Continue decadron 6mg x 9 more days, agree with IV Remdizivir for 5 day course. Will do CTA tomorrow, continue Lovenox 130 mg bid. Morbid obesity is significant risk factor. 130 kg is weight. Pt with acute hypoxemic respiratory failure 2/2 COVID19. Continue decadron 6mg x 9 more days, agree with IV Remdizivir for 5 day course. Will do LE venous dopplers. Continue Lovenox 130 mg bid. Morbid obesity is significant risk factor. 130 kg is weight.

## 2021-03-16 NOTE — PROGRESS NOTE ADULT - PROBLEM SELECTOR PLAN 4
Given current illness will continue O2 via NC for now. If breathing worsens or hypoxia appear primarily nocturnal would consider bipap use qhs.   Asked Pulmonary Dr. Horan and group to follow.

## 2021-03-16 NOTE — PROGRESS NOTE ADULT - SUBJECTIVE AND OBJECTIVE BOX
INTERVAL HPI/OVERNIGHT EVENTS:  Pt seen and examined at bedside.     Allergies/Intolerance: No Known Allergies      MEDICATIONS  (STANDING):  benzonatate 100 milliGRAM(s) Oral every 8 hours  budesonide 160 MICROgram(s)/formoterol 4.5 MICROgram(s) Inhaler 2 Puff(s) Inhalation two times a day  cholecalciferol 5000 Unit(s) Oral daily  dexAMETHasone     Tablet 6 milliGRAM(s) Oral daily  enoxaparin Injectable 130 milliGRAM(s) SubCutaneous two times a day  famotidine    Tablet 20 milliGRAM(s) Oral daily  melatonin 3 milliGRAM(s) Oral at bedtime  remdesivir  IVPB   IV Intermittent   remdesivir  IVPB 100 milliGRAM(s) IV Intermittent every 24 hours  zinc sulfate 220 milliGRAM(s) Oral daily    MEDICATIONS  (PRN):  acetaminophen   Tablet .. 650 milliGRAM(s) Oral every 4 hours PRN Temp greater or equal to 38.5C (101.3F)  ALBUTerol    90 MICROgram(s) HFA Inhaler 2 Puff(s) Inhalation every 6 hours PRN Shortness of Breath and/or Wheezing  guaifenesin/dextromethorphan  Syrup 10 milliLiter(s) Oral every 4 hours PRN Cough        ROS: all systems reviewed and wnl      PHYSICAL EXAMINATION:  Vital Signs Last 24 Hrs  T(C): 37.8 (16 Mar 2021 05:57), Max: 37.8 (16 Mar 2021 05:57)  T(F): 100 (16 Mar 2021 05:57), Max: 100 (16 Mar 2021 05:57)  HR: 82 (16 Mar 2021 05:57) (82 - 86)  BP: 151/81 (16 Mar 2021 05:57) (122/80 - 151/81)  BP(mean): --  RR: 18 (16 Mar 2021 05:57) (18 - 19)  SpO2: 92% (16 Mar 2021 05:57) (92% - 95%)  CAPILLARY BLOOD GLUCOSE          03-15 @ 07:01  -  03-16 @ 07:00  --------------------------------------------------------  IN: 490 mL / OUT: 0 mL / NET: 490 mL        GENERAL:   NECK: supple, No JVD  CHEST/LUNG: clear to auscultation bilaterally; no rales, rhonchi, or wheezing b/l  HEART: normal S1, S2  ABDOMEN: BS+, soft, ND, NT   EXTREMITIES:  pulses palpable; no clubbing, cyanosis, or edema b/l LEs  SKIN: no rashes or lesions      LABS:                        12.8   5.87  )-----------( 149      ( 16 Mar 2021 06:07 )             41.5     03-16    141  |  104  |  20  ----------------------------<  100<H>  4.3   |  28  |  0.66    Ca    8.6      16 Mar 2021 06:09  Phos  2.9     03-16  Mg     2.0     03-16    TPro  6.4  /  Alb  2.9<L>  /  TBili  0.2  /  DBili  <0.1  /  AST  17  /  ALT  11  /  AlkPhos  52  03-16    PT/INR - ( 16 Mar 2021 08:29 )   PT: 14.3 sec;   INR: 1.22 ratio         PTT - ( 14 Mar 2021 18:14 )  PTT:36.9 sec           INTERVAL HPI/OVERNIGHT EVENTS:  Pt seen and examined at bedside.     Allergies/Intolerance: No Known Allergies      MEDICATIONS  (STANDING):  benzonatate 100 milliGRAM(s) Oral every 8 hours  budesonide 160 MICROgram(s)/formoterol 4.5 MICROgram(s) Inhaler 2 Puff(s) Inhalation two times a day  cholecalciferol 5000 Unit(s) Oral daily  dexAMETHasone     Tablet 6 milliGRAM(s) Oral daily  enoxaparin Injectable 130 milliGRAM(s) SubCutaneous two times a day  famotidine    Tablet 20 milliGRAM(s) Oral daily  melatonin 3 milliGRAM(s) Oral at bedtime  remdesivir  IVPB   IV Intermittent   remdesivir  IVPB 100 milliGRAM(s) IV Intermittent every 24 hours  zinc sulfate 220 milliGRAM(s) Oral daily    MEDICATIONS  (PRN):  acetaminophen   Tablet .. 650 milliGRAM(s) Oral every 4 hours PRN Temp greater or equal to 38.5C (101.3F)  ALBUTerol    90 MICROgram(s) HFA Inhaler 2 Puff(s) Inhalation every 6 hours PRN Shortness of Breath and/or Wheezing  guaifenesin/dextromethorphan  Syrup 10 milliLiter(s) Oral every 4 hours PRN Cough        ROS: all systems reviewed and wnl      PHYSICAL EXAMINATION:  Vital Signs Last 24 Hrs  T(C): 37.8 (16 Mar 2021 05:57), Max: 37.8 (16 Mar 2021 05:57)  T(F): 100 (16 Mar 2021 05:57), Max: 100 (16 Mar 2021 05:57)  HR: 82 (16 Mar 2021 05:57) (82 - 86)  BP: 151/81 (16 Mar 2021 05:57) (122/80 - 151/81)  BP(mean): --  RR: 18 (16 Mar 2021 05:57) (18 - 19)  SpO2: 92% (16 Mar 2021 05:57) (92% - 95%)  CAPILLARY BLOOD GLUCOSE          03-15 @ 07:01  -  03-16 @ 07:00  --------------------------------------------------------  IN: 490 mL / OUT: 0 mL / NET: 490 mL        GENERAL: stable on oxygen, no fevers or wheezing   NECK: supple, No JVD  CHEST/LUNG: clear to auscultation bilaterally; no rales, rhonchi, or wheezing b/l  HEART: normal S1, S2  ABDOMEN: BS+, soft, ND, NT   EXTREMITIES:  pulses palpable; no clubbing, cyanosis, or edema b/l LEs  SKIN: no rashes or lesions      LABS:                        12.8   5.87  )-----------( 149      ( 16 Mar 2021 06:07 )             41.5     03-16    141  |  104  |  20  ----------------------------<  100<H>  4.3   |  28  |  0.66    Ca    8.6      16 Mar 2021 06:09  Phos  2.9     03-16  Mg     2.0     03-16    TPro  6.4  /  Alb  2.9<L>  /  TBili  0.2  /  DBili  <0.1  /  AST  17  /  ALT  11  /  AlkPhos  52  03-16    PT/INR - ( 16 Mar 2021 08:29 )   PT: 14.3 sec;   INR: 1.22 ratio         PTT - ( 14 Mar 2021 18:14 )  PTT:36.9 sec

## 2021-03-17 LAB
A1C WITH ESTIMATED AVERAGE GLUCOSE RESULT: 5.7 % — HIGH (ref 4–5.6)
ALBUMIN SERPL ELPH-MCNC: 2.6 G/DL — LOW (ref 3.3–5)
ALP SERPL-CCNC: 47 U/L — SIGNIFICANT CHANGE UP (ref 40–120)
ALT FLD-CCNC: 10 U/L — SIGNIFICANT CHANGE UP (ref 10–45)
ANION GAP SERPL CALC-SCNC: 9 MMOL/L — SIGNIFICANT CHANGE UP (ref 5–17)
AST SERPL-CCNC: 14 U/L — SIGNIFICANT CHANGE UP (ref 10–40)
BILIRUB SERPL-MCNC: 0.1 MG/DL — LOW (ref 0.2–1.2)
BUN SERPL-MCNC: 20 MG/DL — SIGNIFICANT CHANGE UP (ref 7–23)
CALCIUM SERPL-MCNC: 8.5 MG/DL — SIGNIFICANT CHANGE UP (ref 8.4–10.5)
CHLORIDE SERPL-SCNC: 104 MMOL/L — SIGNIFICANT CHANGE UP (ref 96–108)
CHOLEST SERPL-MCNC: 95 MG/DL — SIGNIFICANT CHANGE UP
CO2 SERPL-SCNC: 28 MMOL/L — SIGNIFICANT CHANGE UP (ref 22–31)
CREAT SERPL-MCNC: 0.57 MG/DL — SIGNIFICANT CHANGE UP (ref 0.5–1.3)
CRP SERPL-MCNC: 1.36 MG/DL — HIGH (ref 0–0.4)
D DIMER BLD IA.RAPID-MCNC: 244 NG/ML DDU — HIGH
ESTIMATED AVERAGE GLUCOSE: 117 MG/DL — HIGH (ref 68–114)
FERRITIN SERPL-MCNC: 125 NG/ML — SIGNIFICANT CHANGE UP (ref 15–150)
GLUCOSE BLDC GLUCOMTR-MCNC: 101 MG/DL — HIGH (ref 70–99)
GLUCOSE BLDC GLUCOMTR-MCNC: 111 MG/DL — HIGH (ref 70–99)
GLUCOSE BLDC GLUCOMTR-MCNC: 116 MG/DL — HIGH (ref 70–99)
GLUCOSE BLDC GLUCOMTR-MCNC: 117 MG/DL — HIGH (ref 70–99)
GLUCOSE SERPL-MCNC: 98 MG/DL — SIGNIFICANT CHANGE UP (ref 70–99)
HCT VFR BLD CALC: 40.2 % — SIGNIFICANT CHANGE UP (ref 34.5–45)
HDLC SERPL-MCNC: 31 MG/DL — LOW
HGB BLD-MCNC: 12.4 G/DL — SIGNIFICANT CHANGE UP (ref 11.5–15.5)
LIPID PNL WITH DIRECT LDL SERPL: 54 MG/DL — SIGNIFICANT CHANGE UP
MCHC RBC-ENTMCNC: 27.4 PG — SIGNIFICANT CHANGE UP (ref 27–34)
MCHC RBC-ENTMCNC: 30.8 GM/DL — LOW (ref 32–36)
MCV RBC AUTO: 88.9 FL — SIGNIFICANT CHANGE UP (ref 80–100)
NON HDL CHOLESTEROL: 64 MG/DL — SIGNIFICANT CHANGE UP
NRBC # BLD: 0 /100 WBCS — SIGNIFICANT CHANGE UP (ref 0–0)
PLATELET # BLD AUTO: 148 K/UL — LOW (ref 150–400)
POTASSIUM SERPL-MCNC: 4 MMOL/L — SIGNIFICANT CHANGE UP (ref 3.5–5.3)
POTASSIUM SERPL-SCNC: 4 MMOL/L — SIGNIFICANT CHANGE UP (ref 3.5–5.3)
PROCALCITONIN SERPL-MCNC: 0.1 NG/ML — SIGNIFICANT CHANGE UP (ref 0.02–0.1)
PROT SERPL-MCNC: 6.1 G/DL — SIGNIFICANT CHANGE UP (ref 6–8.3)
RBC # BLD: 4.52 M/UL — SIGNIFICANT CHANGE UP (ref 3.8–5.2)
RBC # FLD: 12.8 % — SIGNIFICANT CHANGE UP (ref 10.3–14.5)
SODIUM SERPL-SCNC: 141 MMOL/L — SIGNIFICANT CHANGE UP (ref 135–145)
TRIGL SERPL-MCNC: 48 MG/DL — SIGNIFICANT CHANGE UP
WBC # BLD: 5.01 K/UL — SIGNIFICANT CHANGE UP (ref 3.8–10.5)
WBC # FLD AUTO: 5.01 K/UL — SIGNIFICANT CHANGE UP (ref 3.8–10.5)

## 2021-03-17 RX ORDER — ENOXAPARIN SODIUM 100 MG/ML
40 INJECTION SUBCUTANEOUS
Refills: 0 | Status: DISCONTINUED | OUTPATIENT
Start: 2021-03-17 | End: 2021-03-19

## 2021-03-17 RX ADMIN — REMDESIVIR 500 MILLIGRAM(S): 5 INJECTION INTRAVENOUS at 10:20

## 2021-03-17 RX ADMIN — ENOXAPARIN SODIUM 40 MILLIGRAM(S): 100 INJECTION SUBCUTANEOUS at 17:36

## 2021-03-17 RX ADMIN — Medication 100 MILLIGRAM(S): at 21:58

## 2021-03-17 RX ADMIN — BUDESONIDE AND FORMOTEROL FUMARATE DIHYDRATE 2 PUFF(S): 160; 4.5 AEROSOL RESPIRATORY (INHALATION) at 21:59

## 2021-03-17 RX ADMIN — ENOXAPARIN SODIUM 130 MILLIGRAM(S): 100 INJECTION SUBCUTANEOUS at 06:00

## 2021-03-17 RX ADMIN — Medication 3 MILLIGRAM(S): at 21:58

## 2021-03-17 RX ADMIN — Medication 6 MILLIGRAM(S): at 05:58

## 2021-03-17 RX ADMIN — Medication 100 MILLIGRAM(S): at 13:10

## 2021-03-17 RX ADMIN — BUDESONIDE AND FORMOTEROL FUMARATE DIHYDRATE 2 PUFF(S): 160; 4.5 AEROSOL RESPIRATORY (INHALATION) at 11:03

## 2021-03-17 RX ADMIN — Medication 5000 UNIT(S): at 13:10

## 2021-03-17 RX ADMIN — Medication 100 MILLIGRAM(S): at 05:58

## 2021-03-17 RX ADMIN — ZINC SULFATE TAB 220 MG (50 MG ZINC EQUIVALENT) 220 MILLIGRAM(S): 220 (50 ZN) TAB at 13:10

## 2021-03-17 RX ADMIN — FAMOTIDINE 20 MILLIGRAM(S): 10 INJECTION INTRAVENOUS at 13:10

## 2021-03-17 NOTE — PROGRESS NOTE ADULT - PROBLEM SELECTOR PLAN 3
Given patient's history of multiple DVTs including when ill with respiratory issues in the past, current COVID19, use of decadron, obesity, patient is at very high risk for recurrent thrombosis. Will thus place on full anticoagulation.  Recurrent DVTs may also justify longer term anticoagulation s/p discharge. Given patient's history of multiple DVTs including when ill with respiratory issues in the past, current COVID19, use of decadron, obesity, patient is at very high risk for recurrent thrombosis. Will thus place on Lovenox 40 mg bid.

## 2021-03-17 NOTE — DIETITIAN INITIAL EVALUATION ADULT. - REASON INDICATOR FOR ASSESSMENT
Pt seen for BMI>40 indicator  Information obtained from: electronic medical record; pt sleeping at time of RD visit, unarousable

## 2021-03-17 NOTE — DIETITIAN INITIAL EVALUATION ADULT. - PROBLEM SELECTOR PLAN 1
Pt with acute hypoxemic respiratory failure 2/2 COVID19  s/p one dose of decadron in ER  continue decadron 6mg x 9 more days  start remdesivir  melatonin qhs for sleep quality  famotidine for gi ppx while on high dose decadron  vit d, zinc  O2 supplementation via NC for now  Support for COPD as below

## 2021-03-17 NOTE — DIETITIAN INITIAL EVALUATION ADULT. - ADD RECOMMEND
1. Consider liberalizing diet to regular as able, POCT controlled, diet calorie controlled in-patient 2. Can continue to provide 3 diet mighty health shakes daily in setting of COVID-19 infection and risk of decreased po intake 3. Provide nutrition education as able 4. Will continue to monitor nutrient intake, wt, labs, f/u per protocol

## 2021-03-17 NOTE — DIETITIAN INITIAL EVALUATION ADULT. - CHIEF COMPLAINT
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

## 2021-03-17 NOTE — DIETITIAN INITIAL EVALUATION ADULT. - OTHER INFO
Pt with no noted h/o T2DM per chart. HgbA1c 5.7% on current admission.  Nutrition Supplements PTA: none per chart    Pt dosing wt noted as 289.9 lbs. Last weight available from 10/2016 prior to bariatric surgery noted as 356 lbs per RD note. Pt current dosing wt would indicate ~66 lb wt loss since surgery.    Pt noted with >75% po intake per nursing flow sheet. She is ordered for 3 diet mighty health shakes daily here as well.  Pt has no c/o nausea, vomiting, diarrhea, or constipation noted.

## 2021-03-17 NOTE — PROGRESS NOTE ADULT - PROBLEM SELECTOR PLAN 1
Pt with acute hypoxemic respiratory failure 2/2 COVID19. Continue decadron 6mg x 9 more days, agree with IV Remdizivir for 5 day course. Will do LE venous dopplers. Continue Lovenox 130 mg bid. Morbid obesity is significant risk factor. 130 kg is weight. Pt with acute hypoxemic respiratory failure 2/2 COVID19. Continue decadron 6mg x 9 more days, agree with IV Remdizivir for 5 day course. LE venous dopplers negative. Decrease Lovenox 40 mg bid. Morbid obesity is significant risk factor. 130 kg is weight. CTA negative for PE.

## 2021-03-17 NOTE — DIETITIAN INITIAL EVALUATION ADULT. - PERTINENT LABORATORY DATA
03-17 @ 06:58: Na 141, BUN 20, Cr 0.57, BG 98, K+ 4.0, Alk Phos 47, ALT/SGPT 10, AST/SGOT 14  03-17: HbA1c 5.7%    CAPILLARY BLOOD GLUCOSE  POCT Blood Glucose.: 116 mg/dL (17 Mar 2021 11:32)  POCT Blood Glucose.: 101 mg/dL (17 Mar 2021 07:46)  POCT Blood Glucose.: 124 mg/dL (16 Mar 2021 21:11)  POCT Blood Glucose.: 121 mg/dL (16 Mar 2021 16:31)

## 2021-03-17 NOTE — DIETITIAN INITIAL EVALUATION ADULT. - ORAL INTAKE PTA/DIET HISTORY
Limited subjective information for po intake history and therapeutic diet history available. Unknown baseline chewing/swallowing function. NKFA noted in chart.

## 2021-03-17 NOTE — DIETITIAN INITIAL EVALUATION ADULT. - PERTINENT MEDS FT
MEDICATIONS  (STANDING):  benzonatate 100 milliGRAM(s) Oral every 8 hours  budesonide 160 MICROgram(s)/formoterol 4.5 MICROgram(s) Inhaler 2 Puff(s) Inhalation two times a day  cholecalciferol 5000 Unit(s) Oral daily  dexAMETHasone     Tablet 6 milliGRAM(s) Oral daily  enoxaparin Injectable 40 milliGRAM(s) SubCutaneous two times a day  famotidine    Tablet 20 milliGRAM(s) Oral daily  melatonin 3 milliGRAM(s) Oral at bedtime  remdesivir  IVPB   IV Intermittent   remdesivir  IVPB 100 milliGRAM(s) IV Intermittent every 24 hours  zinc sulfate 220 milliGRAM(s) Oral daily    MEDICATIONS  (PRN):  acetaminophen   Tablet .. 650 milliGRAM(s) Oral every 4 hours PRN Temp greater or equal to 38.5C (101.3F)  ALBUTerol    90 MICROgram(s) HFA Inhaler 2 Puff(s) Inhalation every 6 hours PRN Shortness of Breath and/or Wheezing  guaifenesin/dextromethorphan  Syrup 10 milliLiter(s) Oral every 4 hours PRN Cough

## 2021-03-17 NOTE — PROGRESS NOTE ADULT - SUBJECTIVE AND OBJECTIVE BOX
INTERVAL HPI/OVERNIGHT EVENTS:  Pt seen and examined at bedside.     Allergies/Intolerance: No Known Allergies      MEDICATIONS  (STANDING):  benzonatate 100 milliGRAM(s) Oral every 8 hours  budesonide 160 MICROgram(s)/formoterol 4.5 MICROgram(s) Inhaler 2 Puff(s) Inhalation two times a day  cholecalciferol 5000 Unit(s) Oral daily  dexAMETHasone     Tablet 6 milliGRAM(s) Oral daily  enoxaparin Injectable 130 milliGRAM(s) SubCutaneous two times a day  famotidine    Tablet 20 milliGRAM(s) Oral daily  melatonin 3 milliGRAM(s) Oral at bedtime  remdesivir  IVPB   IV Intermittent   remdesivir  IVPB 100 milliGRAM(s) IV Intermittent every 24 hours  zinc sulfate 220 milliGRAM(s) Oral daily    MEDICATIONS  (PRN):  acetaminophen   Tablet .. 650 milliGRAM(s) Oral every 4 hours PRN Temp greater or equal to 38.5C (101.3F)  ALBUTerol    90 MICROgram(s) HFA Inhaler 2 Puff(s) Inhalation every 6 hours PRN Shortness of Breath and/or Wheezing  guaifenesin/dextromethorphan  Syrup 10 milliLiter(s) Oral every 4 hours PRN Cough        ROS: all systems reviewed and wnl      PHYSICAL EXAMINATION:  Vital Signs Last 24 Hrs  T(C): 36.7 (17 Mar 2021 05:34), Max: 37.4 (16 Mar 2021 20:53)  T(F): 98 (17 Mar 2021 05:34), Max: 99.3 (16 Mar 2021 20:53)  HR: 72 (17 Mar 2021 05:34) (70 - 78)  BP: 128/83 (17 Mar 2021 05:34) (128/83 - 147/89)  BP(mean): --  RR: 20 (17 Mar 2021 05:34) (18 - 20)  SpO2: 93% (17 Mar 2021 05:34) (93% - 94%)  CAPILLARY BLOOD GLUCOSE      POCT Blood Glucose.: 101 mg/dL (17 Mar 2021 07:46)  POCT Blood Glucose.: 124 mg/dL (16 Mar 2021 21:11)  POCT Blood Glucose.: 121 mg/dL (16 Mar 2021 16:31)  POCT Blood Glucose.: 115 mg/dL (16 Mar 2021 11:52)      03-16 @ 07:01  -  03-17 @ 07:00  --------------------------------------------------------  IN: 490 mL / OUT: 0 mL / NET: 490 mL        GENERAL:   NECK: supple, No JVD  CHEST/LUNG: clear to auscultation bilaterally; no rales, rhonchi, or wheezing b/l  HEART: normal S1, S2  ABDOMEN: BS+, soft, ND, NT   EXTREMITIES:  pulses palpable; no clubbing, cyanosis, or edema b/l LEs  SKIN: no rashes or lesions      LABS:                        12.4   5.01  )-----------( 148      ( 17 Mar 2021 07:03 )             40.2     03-17    141  |  104  |  20  ----------------------------<  98  4.0   |  28  |  0.57    Ca    8.5      17 Mar 2021 06:58  Phos  2.9     03-16  Mg     2.0     03-16    TPro  6.1  /  Alb  2.6<L>  /  TBili  0.1<L>  /  DBili  x   /  AST  14  /  ALT  10  /  AlkPhos  47  03-17    PT/INR - ( 16 Mar 2021 08:29 )   PT: 14.3 sec;   INR: 1.22 ratio                    INTERVAL HPI/OVERNIGHT EVENTS:  Pt seen and examined at bedside.     Allergies/Intolerance: No Known Allergies      MEDICATIONS  (STANDING):  benzonatate 100 milliGRAM(s) Oral every 8 hours  budesonide 160 MICROgram(s)/formoterol 4.5 MICROgram(s) Inhaler 2 Puff(s) Inhalation two times a day  cholecalciferol 5000 Unit(s) Oral daily  dexAMETHasone     Tablet 6 milliGRAM(s) Oral daily  enoxaparin Injectable 130 milliGRAM(s) SubCutaneous two times a day  famotidine    Tablet 20 milliGRAM(s) Oral daily  melatonin 3 milliGRAM(s) Oral at bedtime  remdesivir  IVPB   IV Intermittent   remdesivir  IVPB 100 milliGRAM(s) IV Intermittent every 24 hours  zinc sulfate 220 milliGRAM(s) Oral daily    MEDICATIONS  (PRN):  acetaminophen   Tablet .. 650 milliGRAM(s) Oral every 4 hours PRN Temp greater or equal to 38.5C (101.3F)  ALBUTerol    90 MICROgram(s) HFA Inhaler 2 Puff(s) Inhalation every 6 hours PRN Shortness of Breath and/or Wheezing  guaifenesin/dextromethorphan  Syrup 10 milliLiter(s) Oral every 4 hours PRN Cough        ROS: all systems reviewed and wnl      PHYSICAL EXAMINATION:  Vital Signs Last 24 Hrs  T(C): 36.7 (17 Mar 2021 05:34), Max: 37.4 (16 Mar 2021 20:53)  T(F): 98 (17 Mar 2021 05:34), Max: 99.3 (16 Mar 2021 20:53)  HR: 72 (17 Mar 2021 05:34) (70 - 78)  BP: 128/83 (17 Mar 2021 05:34) (128/83 - 147/89)  BP(mean): --  RR: 20 (17 Mar 2021 05:34) (18 - 20)  SpO2: 93% (17 Mar 2021 05:34) (93% - 94%)  CAPILLARY BLOOD GLUCOSE      POCT Blood Glucose.: 101 mg/dL (17 Mar 2021 07:46)  POCT Blood Glucose.: 124 mg/dL (16 Mar 2021 21:11)  POCT Blood Glucose.: 121 mg/dL (16 Mar 2021 16:31)  POCT Blood Glucose.: 115 mg/dL (16 Mar 2021 11:52)      03-16 @ 07:01  -  03-17 @ 07:00  --------------------------------------------------------  IN: 490 mL / OUT: 0 mL / NET: 490 mL        GENERAL: stable, no wheeze, CP or SOB at rest, comfortable on oxygen.     NECK: supple, No JVD  CHEST/LUNG: clear to auscultation bilaterally; no rales, rhonchi, or wheezing b/l  HEART: normal S1, S2  ABDOMEN: BS+, soft, ND, NT   EXTREMITIES:  pulses palpable; no clubbing, cyanosis, or edema b/l LEs  SKIN: no rashes or lesions      LABS:                        12.4   5.01  )-----------( 148      ( 17 Mar 2021 07:03 )             40.2     03-17    141  |  104  |  20  ----------------------------<  98  4.0   |  28  |  0.57    Ca    8.5      17 Mar 2021 06:58  Phos  2.9     03-16  Mg     2.0     03-16    TPro  6.1  /  Alb  2.6<L>  /  TBili  0.1<L>  /  DBili  x   /  AST  14  /  ALT  10  /  AlkPhos  47  03-17    PT/INR - ( 16 Mar 2021 08:29 )   PT: 14.3 sec;   INR: 1.22 ratio

## 2021-03-18 LAB
ALBUMIN SERPL ELPH-MCNC: 2.8 G/DL — LOW (ref 3.3–5)
ALP SERPL-CCNC: 53 U/L — SIGNIFICANT CHANGE UP (ref 40–120)
ALT FLD-CCNC: 16 U/L — SIGNIFICANT CHANGE UP (ref 10–45)
ANION GAP SERPL CALC-SCNC: 9 MMOL/L — SIGNIFICANT CHANGE UP (ref 5–17)
AST SERPL-CCNC: 27 U/L — SIGNIFICANT CHANGE UP (ref 10–40)
BILIRUB SERPL-MCNC: 0.2 MG/DL — SIGNIFICANT CHANGE UP (ref 0.2–1.2)
BUN SERPL-MCNC: 23 MG/DL — SIGNIFICANT CHANGE UP (ref 7–23)
CALCIUM SERPL-MCNC: 8.7 MG/DL — SIGNIFICANT CHANGE UP (ref 8.4–10.5)
CHLORIDE SERPL-SCNC: 104 MMOL/L — SIGNIFICANT CHANGE UP (ref 96–108)
CO2 SERPL-SCNC: 26 MMOL/L — SIGNIFICANT CHANGE UP (ref 22–31)
CREAT SERPL-MCNC: 0.61 MG/DL — SIGNIFICANT CHANGE UP (ref 0.5–1.3)
GLUCOSE BLDC GLUCOMTR-MCNC: 108 MG/DL — HIGH (ref 70–99)
GLUCOSE SERPL-MCNC: 187 MG/DL — HIGH (ref 70–99)
POTASSIUM SERPL-MCNC: 4.5 MMOL/L — SIGNIFICANT CHANGE UP (ref 3.5–5.3)
POTASSIUM SERPL-SCNC: 4.5 MMOL/L — SIGNIFICANT CHANGE UP (ref 3.5–5.3)
PROT SERPL-MCNC: 6.7 G/DL — SIGNIFICANT CHANGE UP (ref 6–8.3)
SODIUM SERPL-SCNC: 139 MMOL/L — SIGNIFICANT CHANGE UP (ref 135–145)

## 2021-03-18 PROCEDURE — 99233 SBSQ HOSP IP/OBS HIGH 50: CPT

## 2021-03-18 RX ADMIN — REMDESIVIR 500 MILLIGRAM(S): 5 INJECTION INTRAVENOUS at 12:04

## 2021-03-18 RX ADMIN — Medication 3 MILLIGRAM(S): at 21:22

## 2021-03-18 RX ADMIN — Medication 6 MILLIGRAM(S): at 06:02

## 2021-03-18 RX ADMIN — Medication 100 MILLIGRAM(S): at 21:22

## 2021-03-18 RX ADMIN — BUDESONIDE AND FORMOTEROL FUMARATE DIHYDRATE 2 PUFF(S): 160; 4.5 AEROSOL RESPIRATORY (INHALATION) at 21:22

## 2021-03-18 RX ADMIN — Medication 100 MILLIGRAM(S): at 14:21

## 2021-03-18 RX ADMIN — Medication 5000 UNIT(S): at 12:04

## 2021-03-18 RX ADMIN — ZINC SULFATE TAB 220 MG (50 MG ZINC EQUIVALENT) 220 MILLIGRAM(S): 220 (50 ZN) TAB at 12:04

## 2021-03-18 RX ADMIN — ENOXAPARIN SODIUM 40 MILLIGRAM(S): 100 INJECTION SUBCUTANEOUS at 06:03

## 2021-03-18 RX ADMIN — Medication 100 MILLIGRAM(S): at 06:03

## 2021-03-18 RX ADMIN — BUDESONIDE AND FORMOTEROL FUMARATE DIHYDRATE 2 PUFF(S): 160; 4.5 AEROSOL RESPIRATORY (INHALATION) at 10:18

## 2021-03-18 RX ADMIN — FAMOTIDINE 20 MILLIGRAM(S): 10 INJECTION INTRAVENOUS at 12:04

## 2021-03-18 RX ADMIN — ENOXAPARIN SODIUM 40 MILLIGRAM(S): 100 INJECTION SUBCUTANEOUS at 18:35

## 2021-03-18 NOTE — PROGRESS NOTE ADULT - PROBLEM SELECTOR PLAN 1
Pt with acute hypoxemic respiratory failure 2/2 COVID19. Continue decadron 6mg x 9 more days, agree with IV Remdizivir for 5 day course. LE venous dopplers negative. Decrease Lovenox 40 mg bid. Morbid obesity is significant risk factor. 130 kg is weight. CTA negative for PE. Pt with acute hypoxemic respiratory failure 2/2 COVID19. Continue decadron 6mg x 9 more days, agree with IV Remdizivir for 5 day course. LE venous dopplers negative. Decrease Lovenox 40 mg bid. Morbid obesity is significant risk factor. 130 kg is weight. CTA negative for PE. Discharge home Friday afternoon with oxygen.

## 2021-03-18 NOTE — PROGRESS NOTE ADULT - SUBJECTIVE AND OBJECTIVE BOX
INTERVAL HPI/OVERNIGHT EVENTS:  Pt seen and examined at bedside.     Allergies/Intolerance: No Known Allergies      MEDICATIONS  (STANDING):  benzonatate 100 milliGRAM(s) Oral every 8 hours  budesonide 160 MICROgram(s)/formoterol 4.5 MICROgram(s) Inhaler 2 Puff(s) Inhalation two times a day  cholecalciferol 5000 Unit(s) Oral daily  dexAMETHasone     Tablet 6 milliGRAM(s) Oral daily  enoxaparin Injectable 40 milliGRAM(s) SubCutaneous two times a day  famotidine    Tablet 20 milliGRAM(s) Oral daily  melatonin 3 milliGRAM(s) Oral at bedtime  remdesivir  IVPB   IV Intermittent   remdesivir  IVPB 100 milliGRAM(s) IV Intermittent every 24 hours  zinc sulfate 220 milliGRAM(s) Oral daily    MEDICATIONS  (PRN):  acetaminophen   Tablet .. 650 milliGRAM(s) Oral every 4 hours PRN Temp greater or equal to 38.5C (101.3F)  ALBUTerol    90 MICROgram(s) HFA Inhaler 2 Puff(s) Inhalation every 6 hours PRN Shortness of Breath and/or Wheezing  guaifenesin/dextromethorphan  Syrup 10 milliLiter(s) Oral every 4 hours PRN Cough        ROS: all systems reviewed and wnl      PHYSICAL EXAMINATION:  Vital Signs Last 24 Hrs  T(C): 36.7 (18 Mar 2021 04:34), Max: 37.2 (17 Mar 2021 20:00)  T(F): 98 (18 Mar 2021 04:34), Max: 99 (17 Mar 2021 20:00)  HR: 72 (18 Mar 2021 04:34) (70 - 72)  BP: 152/86 (18 Mar 2021 04:34) (131/86 - 152/86)  BP(mean): --  RR: 18 (18 Mar 2021 04:34) (18 - 20)  SpO2: 91% (18 Mar 2021 04:34) (91% - 93%)  CAPILLARY BLOOD GLUCOSE      POCT Blood Glucose.: 111 mg/dL (17 Mar 2021 21:22)  POCT Blood Glucose.: 117 mg/dL (17 Mar 2021 16:41)  POCT Blood Glucose.: 116 mg/dL (17 Mar 2021 11:32)        GENERAL:   NECK: supple, No JVD  CHEST/LUNG: clear to auscultation bilaterally; no rales, rhonchi, or wheezing b/l  HEART: normal S1, S2  ABDOMEN: BS+, soft, ND, NT   EXTREMITIES:  pulses palpable; no clubbing, cyanosis, or edema b/l LEs  SKIN: no rashes or lesions      LABS:                        12.4   5.01  )-----------( 148      ( 17 Mar 2021 07:03 )             40.2     03-17    141  |  104  |  20  ----------------------------<  98  4.0   |  28  |  0.57    Ca    8.5      17 Mar 2021 06:58    TPro  6.1  /  Alb  2.6<L>  /  TBili  0.1<L>  /  DBili  x   /  AST  14  /  ALT  10  /  AlkPhos  47  03-17               INTERVAL HPI/OVERNIGHT EVENTS:  Pt seen and examined at bedside.     Allergies/Intolerance: No Known Allergies      MEDICATIONS  (STANDING):  benzonatate 100 milliGRAM(s) Oral every 8 hours  budesonide 160 MICROgram(s)/formoterol 4.5 MICROgram(s) Inhaler 2 Puff(s) Inhalation two times a day  cholecalciferol 5000 Unit(s) Oral daily  dexAMETHasone     Tablet 6 milliGRAM(s) Oral daily  enoxaparin Injectable 40 milliGRAM(s) SubCutaneous two times a day  famotidine    Tablet 20 milliGRAM(s) Oral daily  melatonin 3 milliGRAM(s) Oral at bedtime  remdesivir  IVPB   IV Intermittent   remdesivir  IVPB 100 milliGRAM(s) IV Intermittent every 24 hours  zinc sulfate 220 milliGRAM(s) Oral daily    MEDICATIONS  (PRN):  acetaminophen   Tablet .. 650 milliGRAM(s) Oral every 4 hours PRN Temp greater or equal to 38.5C (101.3F)  ALBUTerol    90 MICROgram(s) HFA Inhaler 2 Puff(s) Inhalation every 6 hours PRN Shortness of Breath and/or Wheezing  guaifenesin/dextromethorphan  Syrup 10 milliLiter(s) Oral every 4 hours PRN Cough        ROS: all systems reviewed and wnl      PHYSICAL EXAMINATION:  Vital Signs Last 24 Hrs  T(C): 36.7 (18 Mar 2021 04:34), Max: 37.2 (17 Mar 2021 20:00)  T(F): 98 (18 Mar 2021 04:34), Max: 99 (17 Mar 2021 20:00)  HR: 72 (18 Mar 2021 04:34) (70 - 72)  BP: 152/86 (18 Mar 2021 04:34) (131/86 - 152/86)  BP(mean): --  RR: 18 (18 Mar 2021 04:34) (18 - 20)  SpO2: 91% (18 Mar 2021 04:34) (91% - 93%)  CAPILLARY BLOOD GLUCOSE      POCT Blood Glucose.: 111 mg/dL (17 Mar 2021 21:22)  POCT Blood Glucose.: 117 mg/dL (17 Mar 2021 16:41)  POCT Blood Glucose.: 116 mg/dL (17 Mar 2021 11:32)        GENERAL: stable, comfortable at rest, still on NC  NECK: supple, No JVD  CHEST/LUNG: clear to auscultation bilaterally; no rales, rhonchi, or wheezing b/l  HEART: normal S1, S2  ABDOMEN: BS+, soft, ND, NT   EXTREMITIES:  pulses palpable; no clubbing, cyanosis, or edema b/l LEs  SKIN: no rashes or lesions      LABS:                        12.4   5.01  )-----------( 148      ( 17 Mar 2021 07:03 )             40.2     03-17    141  |  104  |  20  ----------------------------<  98  4.0   |  28  |  0.57    Ca    8.5      17 Mar 2021 06:58    TPro  6.1  /  Alb  2.6<L>  /  TBili  0.1<L>  /  DBili  x   /  AST  14  /  ALT  10  /  AlkPhos  47  03-17

## 2021-03-18 NOTE — PROGRESS NOTE ADULT - PROBLEM SELECTOR PLAN 3
Given patient's history of multiple DVTs including when ill with respiratory issues in the past, current COVID19, use of decadron, obesity, patient is at very high risk for recurrent thrombosis. Will thus place on Lovenox 40 mg bid.

## 2021-03-18 NOTE — PROGRESS NOTE ADULT - SUBJECTIVE AND OBJECTIVE BOX
PULMONARY PROGRESS NOTE    TORI PAREKH  MRN-[id number]    Patient is a 60y old  Female who presents with a chief complaint of Acute hypoxemic respiratory failure 2/2 COVID19 (18 Mar 2021 09:50)      HPI:  remains on 02  rare use of IS    -    ROS:   -    ACTIVE MEDICATION LIST:  MEDICATIONS  (STANDING):  benzonatate 100 milliGRAM(s) Oral every 8 hours  budesonide 160 MICROgram(s)/formoterol 4.5 MICROgram(s) Inhaler 2 Puff(s) Inhalation two times a day  cholecalciferol 5000 Unit(s) Oral daily  dexAMETHasone     Tablet 6 milliGRAM(s) Oral daily  enoxaparin Injectable 40 milliGRAM(s) SubCutaneous two times a day  famotidine    Tablet 20 milliGRAM(s) Oral daily  melatonin 3 milliGRAM(s) Oral at bedtime  remdesivir  IVPB   IV Intermittent   remdesivir  IVPB 100 milliGRAM(s) IV Intermittent every 24 hours  zinc sulfate 220 milliGRAM(s) Oral daily    MEDICATIONS  (PRN):  acetaminophen   Tablet .. 650 milliGRAM(s) Oral every 4 hours PRN Temp greater or equal to 38.5C (101.3F)  ALBUTerol    90 MICROgram(s) HFA Inhaler 2 Puff(s) Inhalation every 6 hours PRN Shortness of Breath and/or Wheezing  guaifenesin/dextromethorphan  Syrup 10 milliLiter(s) Oral every 4 hours PRN Cough      EXAM:  Vital Signs Last 24 Hrs  T(C): 36.9 (18 Mar 2021 12:37), Max: 37.2 (17 Mar 2021 20:00)  T(F): 98.4 (18 Mar 2021 12:37), Max: 99 (17 Mar 2021 20:00)  HR: 64 (18 Mar 2021 12:37) (64 - 72)  BP: 145/81 (18 Mar 2021 12:37) (131/86 - 152/86)  BP(mean): --  RR: 18 (18 Mar 2021 12:38) (18 - 18)  SpO2: 78% (18 Mar 2021 12:38) (78% - 93%)    GENERAL: The patient is awake and alert in no apparent distress.     LUNGS:  respirations unlabored    HEART: Regular rate                              12.4   5.01  )-----------( 148      ( 17 Mar 2021 07:03 )             40.2       03-18    139  |  104  |  23  ----------------------------<  187<H>  4.5   |  26  |  0.61    Ca    8.7      18 Mar 2021 10:08    TPro  6.7  /  Alb  2.8<L>  /  TBili  0.2  /  DBili  x   /  AST  27  /  ALT  16  /  AlkPhos  53  03-18     r< from: CT Angio Chest w/ IV Cont (03.16.21 @ 22:07) >    EXAM:  CT ANGIO CHEST (W)AW IC                            PROCEDURE DATE:  03/16/2021            INTERPRETATION:  INDICATION, CLINICAL INFORMATION: Shortness of breath    TECHNIQUE: CT pulmonary angiogram of the chest was performed. Coronal and sagittal images were reconstructed. Maximum intensity projection images were generated. Images were obtained after the uneventful administration of 75 cc nonionic intravenous Omnipaque 350. 25 cc of Omnipaque 350 was discarded.      COMPARISON: CTA chest12/28/2016.    FINDINGS:    PULMONARY VESSELS: No main or lobar pulmonary embolus. The segmental and subsegmental branches of pulmonary arteries are not evaluated secondary to motion and streak artifact. Main pulmonary artery normal in diameter.    HEART AND VASCULATURE: Heart size is normal. No pericardial effusion. No aortic aneurysm or dissection.    LUNGS, AIRWAYS, PLEURA: Patent central airways. Emphysema. Patchy groundglass opacities within left upper lobe and to a lesser degree left lower lobe. Trace left pleural effusion.    MEDIASTINUM: No mass or lymphadenopathy.    UPPER ABDOMEN: Liver cysts. Right renal cyst. Large cystic structure within left retroperitoneum is incompletely imaged/evaluated. Status post sleeve gastrectomy.    BONES AND SOFT TISSUES: No aggressive osseous lesion.    LOWER NECK: Enlarged thyroid gland with substernal extension is again noted.    IMPRESSION:    No main or lobar pulmonary embolus. The segmental and subsegmental branches of pulmonary arteries are not evaluated secondary to motion and streak artifact.    Emphysema. Patchy groundglass opacities within left upper lobe and to a lesser degree left lower lobe. Trace left pleural effusion.                    ARIS RODRIGUEZ MD; Attending Radiologist  This document has been electronically signed. Mar 17 2021 10:04AM    < end of copied text >  < from: [facility] Duplex Lower Ext Vein Scan, Bilat (03.16.21 @ 18:12) >    EXAM:  DUPLEX SCAN EXT VEINS LOWER BI                            PROCEDURE DATE:  03/16/2021            INTERPRETATION:  CLINICAL INFORMATION: Covid positive. History of left leg deep vein thrombosis.    COMPARISON: Venous Doppler dated 01/27/2016    TECHNIQUE: Duplex sonography of the BILATERAL LOWER extremity veins with color and spectral Doppler, with and without compression.    FINDINGS:    RIGHT:  Normal compressibility of the RIGHT common femoral, femoral and popliteal veins.  Doppler examination shows normal spontaneous and phasic flow.  Calf veins were not imaged as per Covid protocol.    LEFT:  Normal compressibility of the LEFT common femoral, femoral and popliteal veins.  Doppler examination shows normal spontaneous and phasic flow.  Calf veins were not imaged as per Covid protocol.    IMPRESSION:  No evidence of deep venous thrombosis in either lower extremity.                    AMAN CALL M.D., ATTENDING RADIOLOGIST  This document has been electronically signed. Mar 17 2021  9:43AM    < end of copied text >      PROBLEM LIST:  60y Female with HEALTH ISSUES - PROBLEM Dx:  RENUKA treated with BiPAP  RENUKA treated with BiPAP    History of DVT (deep vein thrombosis)  History of DVT (deep vein thrombosis)    Chronic obstructive pulmonary disease, unspecified COPD type  Chronic obstructive pulmonary disease, unspecified COPD type    Acute hypoxemic respiratory failure due to COVID-19  Acute hypoxemic respiratory failure due to COVID-19           RECS:  on chronic inhalers  02 requirements noted  continue remdesivir/dexamethasone protocol  CTA and US negative for acute VTE- would suggest 30 days of aspirin outpatient   needs f/u on her RENUKA outpatient with her pulm doc      Please call with any questions.    Lizzy Gomez DO  [facility] Pulmonary/Sleep Medicine  [phone]

## 2021-03-18 NOTE — CHART NOTE - NSCHARTNOTEFT_GEN_A_CORE
59 y/o female with PMHx of COPD, former smoker, DVT, obesity and RENUKA admitted with hypoxia secondary to COVID pneumonia.  s/p Remdesivir X 5 days and on Dexamethasone. Respiratory status improved, however still requires oxygen.   O2 sat on RA at rest    , dropped to 94% on RA during ambulation, O2 sat on 2 L during ambulation --    ICD 10: J12.82  DMITRI: 12 Months     Joseline FLYNN  #[id number]

## 2021-03-19 ENCOUNTER — TRANSCRIPTION ENCOUNTER (OUTPATIENT)
Age: 61
End: 2021-03-19

## 2021-03-19 LAB
ALBUMIN SERPL ELPH-MCNC: 2.7 G/DL — LOW (ref 3.3–5)
ALP SERPL-CCNC: 51 U/L — SIGNIFICANT CHANGE UP (ref 40–120)
ALT FLD-CCNC: 20 U/L — SIGNIFICANT CHANGE UP (ref 10–45)
AST SERPL-CCNC: 28 U/L — SIGNIFICANT CHANGE UP (ref 10–40)
BILIRUB DIRECT SERPL-MCNC: <0.1 MG/DL — SIGNIFICANT CHANGE UP (ref 0–0.2)
BILIRUB INDIRECT FLD-MCNC: >0.1 MG/DL — LOW (ref 0.2–1)
BILIRUB SERPL-MCNC: 0.2 MG/DL — SIGNIFICANT CHANGE UP (ref 0.2–1.2)
CK SERPL-CCNC: 39 U/L — SIGNIFICANT CHANGE UP (ref 25–170)
GLUCOSE BLDC GLUCOMTR-MCNC: 104 MG/DL — HIGH (ref 70–99)
GLUCOSE BLDC GLUCOMTR-MCNC: 106 MG/DL — HIGH (ref 70–99)
GLUCOSE BLDC GLUCOMTR-MCNC: 119 MG/DL — HIGH (ref 70–99)
GLUCOSE BLDC GLUCOMTR-MCNC: 136 MG/DL — HIGH (ref 70–99)
GLUCOSE BLDC GLUCOMTR-MCNC: 168 MG/DL — HIGH (ref 70–99)
PROT SERPL-MCNC: 6.2 G/DL — SIGNIFICANT CHANGE UP (ref 6–8.3)

## 2021-03-19 PROCEDURE — 99233 SBSQ HOSP IP/OBS HIGH 50: CPT

## 2021-03-19 RX ORDER — CHOLECALCIFEROL (VITAMIN D3) 125 MCG
5000 CAPSULE ORAL
Qty: 0 | Refills: 0 | DISCHARGE
Start: 2021-03-19

## 2021-03-19 RX ORDER — RIVAROXABAN 15 MG-20MG
1 KIT ORAL
Qty: 30 | Refills: 0
Start: 2021-03-19 | End: 2021-04-17

## 2021-03-19 RX ORDER — ACETAMINOPHEN 500 MG
2 TABLET ORAL
Qty: 0 | Refills: 0 | DISCHARGE
Start: 2021-03-19

## 2021-03-19 RX ORDER — GUAIFENESIN/DEXTROMETHORPHAN 600MG-30MG
10 TABLET, EXTENDED RELEASE 12 HR ORAL
Qty: 0 | Refills: 0 | DISCHARGE
Start: 2021-03-19

## 2021-03-19 RX ORDER — RIVAROXABAN 15 MG-20MG
10 KIT ORAL DAILY
Refills: 0 | Status: DISCONTINUED | OUTPATIENT
Start: 2021-03-20 | End: 2021-03-20

## 2021-03-19 RX ORDER — LANOLIN ALCOHOL/MO/W.PET/CERES
1 CREAM (GRAM) TOPICAL
Qty: 0 | Refills: 0 | DISCHARGE
Start: 2021-03-19

## 2021-03-19 RX ADMIN — ZINC SULFATE TAB 220 MG (50 MG ZINC EQUIVALENT) 220 MILLIGRAM(S): 220 (50 ZN) TAB at 11:05

## 2021-03-19 RX ADMIN — FAMOTIDINE 20 MILLIGRAM(S): 10 INJECTION INTRAVENOUS at 11:05

## 2021-03-19 RX ADMIN — BUDESONIDE AND FORMOTEROL FUMARATE DIHYDRATE 2 PUFF(S): 160; 4.5 AEROSOL RESPIRATORY (INHALATION) at 21:39

## 2021-03-19 RX ADMIN — ENOXAPARIN SODIUM 40 MILLIGRAM(S): 100 INJECTION SUBCUTANEOUS at 05:27

## 2021-03-19 RX ADMIN — BUDESONIDE AND FORMOTEROL FUMARATE DIHYDRATE 2 PUFF(S): 160; 4.5 AEROSOL RESPIRATORY (INHALATION) at 10:05

## 2021-03-19 RX ADMIN — Medication 100 MILLIGRAM(S): at 21:38

## 2021-03-19 RX ADMIN — REMDESIVIR 500 MILLIGRAM(S): 5 INJECTION INTRAVENOUS at 11:05

## 2021-03-19 RX ADMIN — Medication 100 MILLIGRAM(S): at 13:17

## 2021-03-19 RX ADMIN — Medication 3 MILLIGRAM(S): at 21:38

## 2021-03-19 RX ADMIN — Medication 100 MILLIGRAM(S): at 05:27

## 2021-03-19 RX ADMIN — Medication 5000 UNIT(S): at 11:05

## 2021-03-19 RX ADMIN — Medication 6 MILLIGRAM(S): at 05:27

## 2021-03-19 NOTE — PROGRESS NOTE ADULT - PROBLEM SELECTOR PLAN 3
Given patient's history of multiple DVTs including when ill with respiratory issues in the past, current COVID19, use of decadron, obesity, patient is at very high risk for recurrent thrombosis. Will thus place on Lovenox 40 mg bid. Given patient's history of multiple DVTs including when ill with respiratory issues in the past, current COVID19, use of decadron, obesity, patient is at very high risk for recurrent thrombosis. Will thus place on Lovenox 40 mg bid, Xarelto upon discharge.

## 2021-03-19 NOTE — PROGRESS NOTE ADULT - SUBJECTIVE AND OBJECTIVE BOX
PULMONARY PROGRESS NOTE    TORI PAREKH  MRN-[id number]    Patient is a 60y old  Female who presents with a chief complaint of Acute hypoxemic respiratory failure 2/2 COVID19 (19 Mar 2021 12:07)      HPI:  on 2L  comfortable  rare use of IS    ROS:   -    ACTIVE MEDICATION LIST:  MEDICATIONS  (STANDING):  benzonatate 100 milliGRAM(s) Oral every 8 hours  budesonide 160 MICROgram(s)/formoterol 4.5 MICROgram(s) Inhaler 2 Puff(s) Inhalation two times a day  cholecalciferol 5000 Unit(s) Oral daily  dexAMETHasone     Tablet 6 milliGRAM(s) Oral daily  famotidine    Tablet 20 milliGRAM(s) Oral daily  melatonin 3 milliGRAM(s) Oral at bedtime  rivaroxaban 10 milliGRAM(s) Oral daily  zinc sulfate 220 milliGRAM(s) Oral daily    MEDICATIONS  (PRN):  acetaminophen   Tablet .. 650 milliGRAM(s) Oral every 4 hours PRN Temp greater or equal to 38.5C (101.3F)  ALBUTerol    90 MICROgram(s) HFA Inhaler 2 Puff(s) Inhalation every 6 hours PRN Shortness of Breath and/or Wheezing  guaifenesin/dextromethorphan  Syrup 10 milliLiter(s) Oral every 4 hours PRN Cough      EXAM:  Vital Signs Last 24 Hrs  T(C): 37.1 (19 Mar 2021 04:34), Max: 37.1 (19 Mar 2021 04:34)  T(F): 98.8 (19 Mar 2021 04:34), Max: 98.8 (19 Mar 2021 04:34)  HR: 67 (19 Mar 2021 05:24) (56 - 68)  BP: 136/85 (19 Mar 2021 05:24) (136/85 - 156/81)  BP(mean): --  RR: 20 (19 Mar 2021 05:24) (18 - 20)  SpO2: 87% (19 Mar 2021 13:16) (84% - 94%)    GENERAL: The patient is awake and alert in no apparent distress.     LUNGS:  respirations unlabored    HEART: Regular rate            03-18    139  |  104  |  23  ----------------------------<  187<H>  4.5   |  26  |  0.61    Ca    8.7      18 Mar 2021 10:08    TPro  6.2  /  Alb  2.7<L>  /  TBili  0.2  /  DBili  <0.1  /  AST  28  /  ALT  20  /  AlkPhos  51  03-19     rad< from: CT Angio Chest w/ IV Cont (03.16.21 @ 22:07) >      INTERPRETATION:  INDICATION, CLINICAL INFORMATION: Shortness of breath    TECHNIQUE: CT pulmonary angiogram of the chest was performed. Coronal and sagittal images were reconstructed. Maximum intensity projection images were generated. Images were obtained after the uneventful administration of 75 cc nonionic intravenous Omnipaque 350. 25 cc of Omnipaque 350 was discarded.      COMPARISON: CTA chest12/28/2016.    FINDINGS:    PULMONARY VESSELS: No main or lobar pulmonary embolus. The segmental and subsegmental branches of pulmonary arteries are not evaluated secondary to motion and streak artifact. Main pulmonary artery normal in diameter.    HEART AND VASCULATURE: Heart size is normal. No pericardial effusion. No aortic aneurysm or dissection.    LUNGS, AIRWAYS, PLEURA: Patent central airways. Emphysema. Patchy groundglass opacities within left upper lobe and to a lesser degree left lower lobe. Trace left pleural effusion.    MEDIASTINUM: No mass or lymphadenopathy.    UPPER ABDOMEN: Liver cysts. Right renal cyst. Large cystic structure within left retroperitoneum is incompletely imaged/evaluated. Status post sleeve gastrectomy.    BONES AND SOFT TISSUES: No aggressive osseous lesion.    LOWER NECK: Enlarged thyroid gland with substernal extension is again noted.    IMPRESSION:    No main or lobar pulmonary embolus. The segmental and subsegmental branches of pulmonary arteries are not evaluated secondary to motion and streak artifact.    Emphysema. Patchy groundglass opacities within left upper lobe and to a lesser degree left lower lobe. Trace left pleural effusion.                    ARIS RODRIGUEZ MD; Attending Radiologist  This document has been electronically signed. Mar 17 2021 10:04AM    < end of copied text >    PROBLEM LIST:  60y Female with HEALTH ISSUES - PROBLEM Dx:  RENUKA treated with BiPAP  RENUKA treated with BiPAP    History of DVT (deep vein thrombosis)  History of DVT (deep vein thrombosis)    Chronic obstructive pulmonary disease, unspecified COPD type  Chronic obstructive pulmonary disease, unspecified COPD type    Acute hypoxemic respiratory failure due to COVID-19  Acute hypoxemic respiratory failure due to COVID-19             RECS:  02 for discharge  f/u pulm outpatient re: RENUKA and needing NIVV  agree with xarelto upon discharge  continue inhalers  use IS upon discharge  reminded her goal pulse ox > 92% on RA          Please call with any questions.    Lizzy Gomez DO  [facility] Pulmonary/Sleep Medicine  [phone]

## 2021-03-19 NOTE — DISCHARGE NOTE PROVIDER - HOSPITAL COURSE
11yo F w/pmhx of COPD, multiple DVTs of the LLE previously on AC but now discontinued, Obesity, RENUKA on BiPAP, who now presents with 3 day hx worsening shortness of breath and hypoxemia in the setting of recent COVID19 diagnosis.     Nutritional Assessment:  · Nutritional Assessment	This patient has been assessed with a concern for Malnutrition and has been determined to have a diagnosis/diagnoses of Morbid obesity (BMI > 40).    This patient is being managed with:   Diet Consistent Carbohydrate/No Snacks-  Entered: Mar 16 2021 10:09AM     Problem/Plan - 1:  ·  Problem: Acute hypoxemic respiratory failure due to COVID-19.  Plan: Pt with acute hypoxemic respiratory failure 2/2 COVID19. Rec'd 6 doses of decadron in the hospital.  s/p 5 day course of Remdesivir. LE venous dopplers negative. s/p Lovenox for prophylaxis, being discharged on prophylactic Xarelto. Morbid obesity is significant risk factor. 130 kg is weight. CTA negative for PE. Discharge home Friday afternoon with oxygen.      Problem/Plan - 2:  ·  Problem: Chronic obstructive pulmonary disease, unspecified COPD type.  Plan: Symbicort bid, Albuterol PRN.  Pulmonary follow up as an outpatient.     Problem/Plan - 3:  ·  Problem: History of DVT (deep vein thrombosis).  Plan: Given patient's history of multiple DVTs including when ill with respiratory issues in the past, current COVID19, use of decadron, obesity, patient is at very high risk for recurrent thrombosis.  s/p Lovenox for prophylaxis, being discharged on prophylactic Xarelto.     Problem/Plan - 4:  ·  Problem: RENUKA treated with BiPAP.  Plan: Given current illness will continue O2 via NC for now.    9yo F w/pmhx of COPD, multiple DVTs of the LLE previously on AC but now discontinued, Obesity, RENUKA on BiPAP, who now presents with 3 day hx worsening shortness of breath and hypoxemia in the setting of recent COVID19 diagnosis.     Nutritional Assessment:  · Nutritional Assessment	This patient has been assessed with a concern for Malnutrition and has been determined to have a diagnosis/diagnoses of Morbid obesity (BMI > 40).    This patient is being managed with:   Diet Consistent Carbohydrate/No Snacks-  Entered: Mar 16 2021 10:09AM     Problem/Plan - 1:  ·  Problem: Acute hypoxemic respiratory failure due to COVID-19.  Plan: Pt with acute hypoxemic respiratory failure 2/2 COVID19. Rec'd 6 doses of decadron in the hospital.  s/p 5 day course of Remdesivir. LE venous dopplers negative. s/p Lovenox for prophylaxis, being discharged on prophylactic Xarelto. Morbid obesity is significant risk factor. 130 kg is weight. CTA negative for PE. Patient is being discharged home with oxygen as facilitated by CM.     Problem/Plan - 2:  ·  Problem: Chronic obstructive pulmonary disease, unspecified COPD type.  Plan: Symbicort bid, Albuterol PRN.  Pulmonary follow up as an outpatient.     Problem/Plan - 3:  ·  Problem: History of DVT (deep vein thrombosis).  Plan: Given patient's history of multiple DVTs including when ill with respiratory issues in the past, current COVID19, use of decadron, obesity, patient is at very high risk for recurrent thrombosis.  s/p Lovenox for prophylaxis, being discharged on prophylactic Xarelto.     Problem/Plan - 4:  ·  Problem: RENUKA treated with BiPAP.  Plan: Given current illness will continue O2 via NC for now.       Patient cleared by attending for discharge - will follow up with PCP and Pulmonary within 1 week. 9 yo female PMH COPD, multiple DVTs of the LLE was previously on AC but now discontinued, morbid obesity, RENUKA on BiPAP, who now presents with 3 day hx worsening shortness of breath and hypoxemia in the setting of recent COVID19 diagnosis.      Had acute hypoxemic respiratory failure due to COVID-19.  Was given IV Decadron in the hospital.  s/p 5 day course of Remdesivir. LE venous dopplers negative. s/p Lovenox for prophylaxis, being discharged on prophylactic Xarelto for 30 days post discharge. Morbid obesity is significant risk factor. 130 kg is weight. CTA negative for PE. Patient is being discharged home with oxygen as facilitated by CM. We continued Symbicort bid, Albuterol PRN.  Pulmonary follow up as an outpatient. 11 yo female PMH COPD, multiple DVTs of the LLE was previously on AC but now discontinued, morbid obesity with BMI 44, RENUKA on BiPAP, who now presents with 3 day hx worsening shortness of breath and hypoxemia in the setting of new COVID19 diagnosis.      Had acute hypoxemic respiratory failure due to COVID-19.  Was given IV Decadron in the hospital.  s/p 5 day course of Remdesivir. LE venous dopplers negative. s/p Lovenox for prophylaxis, being discharged on prophylactic Xarelto for 30 days post discharge. Morbid obesity is significant risk factor. 130 kg is weight. CTA negative for PE.     Patient is being discharged home with oxygen as facilitated by CM. We continued Symbicort bid, Albuterol PRN.  Pulmonary follow up as an outpatient. See attached med list.

## 2021-03-19 NOTE — PROGRESS NOTE ADULT - SUBJECTIVE AND OBJECTIVE BOX
INTERVAL HPI/OVERNIGHT EVENTS:  Pt seen and examined at bedside.     Allergies/Intolerance: No Known Allergies      MEDICATIONS  (STANDING):  benzonatate 100 milliGRAM(s) Oral every 8 hours  budesonide 160 MICROgram(s)/formoterol 4.5 MICROgram(s) Inhaler 2 Puff(s) Inhalation two times a day  cholecalciferol 5000 Unit(s) Oral daily  dexAMETHasone     Tablet 6 milliGRAM(s) Oral daily  enoxaparin Injectable 40 milliGRAM(s) SubCutaneous two times a day  famotidine    Tablet 20 milliGRAM(s) Oral daily  melatonin 3 milliGRAM(s) Oral at bedtime  remdesivir  IVPB   IV Intermittent   remdesivir  IVPB 100 milliGRAM(s) IV Intermittent every 24 hours  zinc sulfate 220 milliGRAM(s) Oral daily    MEDICATIONS  (PRN):  acetaminophen   Tablet .. 650 milliGRAM(s) Oral every 4 hours PRN Temp greater or equal to 38.5C (101.3F)  ALBUTerol    90 MICROgram(s) HFA Inhaler 2 Puff(s) Inhalation every 6 hours PRN Shortness of Breath and/or Wheezing  guaifenesin/dextromethorphan  Syrup 10 milliLiter(s) Oral every 4 hours PRN Cough        ROS: all systems reviewed and wnl      PHYSICAL EXAMINATION:  Vital Signs Last 24 Hrs  T(C): 37.1 (19 Mar 2021 04:34), Max: 37.1 (19 Mar 2021 04:34)  T(F): 98.8 (19 Mar 2021 04:34), Max: 98.8 (19 Mar 2021 04:34)  HR: 67 (19 Mar 2021 05:24) (56 - 68)  BP: 136/85 (19 Mar 2021 05:24) (136/85 - 156/81)  BP(mean): --  RR: 20 (19 Mar 2021 05:24) (18 - 20)  SpO2: 92% (19 Mar 2021 05:24) (78% - 94%)  CAPILLARY BLOOD GLUCOSE      POCT Blood Glucose.: 168 mg/dL (19 Mar 2021 09:06)  POCT Blood Glucose.: 108 mg/dL (18 Mar 2021 21:31)      03-18 @ 07:01  -  03-19 @ 07:00  --------------------------------------------------------  IN: 100 mL / OUT: 0 mL / NET: 100 mL        GENERAL:   NECK: supple, No JVD  CHEST/LUNG: clear to auscultation bilaterally; no rales, rhonchi, or wheezing b/l  HEART: normal S1, S2  ABDOMEN: BS+, soft, ND, NT   EXTREMITIES:  pulses palpable; no clubbing, cyanosis, or edema b/l LEs  SKIN: no rashes or lesions      LABS:    03-18    139  |  104  |  23  ----------------------------<  187<H>  4.5   |  26  |  0.61    Ca    8.7      18 Mar 2021 10:08    TPro  6.2  /  Alb  2.7<L>  /  TBili  0.2  /  DBili  <0.1  /  AST  28  /  ALT  20  /  AlkPhos  51  03-19               INTERVAL HPI/OVERNIGHT EVENTS:  Pt seen and examined at bedside.     Allergies/Intolerance: No Known Allergies      MEDICATIONS  (STANDING):  benzonatate 100 milliGRAM(s) Oral every 8 hours  budesonide 160 MICROgram(s)/formoterol 4.5 MICROgram(s) Inhaler 2 Puff(s) Inhalation two times a day  cholecalciferol 5000 Unit(s) Oral daily  dexAMETHasone     Tablet 6 milliGRAM(s) Oral daily  enoxaparin Injectable 40 milliGRAM(s) SubCutaneous two times a day  famotidine    Tablet 20 milliGRAM(s) Oral daily  melatonin 3 milliGRAM(s) Oral at bedtime  remdesivir  IVPB   IV Intermittent   remdesivir  IVPB 100 milliGRAM(s) IV Intermittent every 24 hours  zinc sulfate 220 milliGRAM(s) Oral daily    MEDICATIONS  (PRN):  acetaminophen   Tablet .. 650 milliGRAM(s) Oral every 4 hours PRN Temp greater or equal to 38.5C (101.3F)  ALBUTerol    90 MICROgram(s) HFA Inhaler 2 Puff(s) Inhalation every 6 hours PRN Shortness of Breath and/or Wheezing  guaifenesin/dextromethorphan  Syrup 10 milliLiter(s) Oral every 4 hours PRN Cough        ROS: all systems reviewed and wnl      PHYSICAL EXAMINATION:  Vital Signs Last 24 Hrs  T(C): 37.1 (19 Mar 2021 04:34), Max: 37.1 (19 Mar 2021 04:34)  T(F): 98.8 (19 Mar 2021 04:34), Max: 98.8 (19 Mar 2021 04:34)  HR: 67 (19 Mar 2021 05:24) (56 - 68)  BP: 136/85 (19 Mar 2021 05:24) (136/85 - 156/81)  BP(mean): --  RR: 20 (19 Mar 2021 05:24) (18 - 20)  SpO2: 92% (19 Mar 2021 05:24) (78% - 94%)  CAPILLARY BLOOD GLUCOSE      POCT Blood Glucose.: 168 mg/dL (19 Mar 2021 09:06)  POCT Blood Glucose.: 108 mg/dL (18 Mar 2021 21:31)      03-18 @ 07:01  -  03-19 @ 07:00  --------------------------------------------------------  IN: 100 mL / OUT: 0 mL / NET: 100 mL        GENERAL: stable, no new complaints  NECK: supple, No JVD  CHEST/LUNG: clear to auscultation bilaterally; no rales, rhonchi, or wheezing b/l  HEART: normal S1, S2  ABDOMEN: BS+, soft, ND, NT   EXTREMITIES:  pulses palpable; no clubbing, cyanosis, or edema b/l LEs  SKIN: no rashes or lesions      LABS:    03-18    139  |  104  |  23  ----------------------------<  187<H>  4.5   |  26  |  0.61    Ca    8.7      18 Mar 2021 10:08    TPro  6.2  /  Alb  2.7<L>  /  TBili  0.2  /  DBili  <0.1  /  AST  28  /  ALT  20  /  AlkPhos  51  03-19

## 2021-03-19 NOTE — DISCHARGE NOTE PROVIDER - NSDCMRMEDTOKEN_GEN_ALL_CORE_FT
acetaminophen 325 mg oral tablet: 2 tab(s) orally every 4 hours, As needed, Temp greater or equal to 38.5C (101.3F)  ALBUTEROL HFA INH (200 PUFFS) 8.5GM: INHALE 1 TO 2 PUFFS PO Q 6 H PRN  cholecalciferol oral tablet: 5000 unit(s) orally once a day  guaifenesin-dextromethorphan 100 mg-10 mg/5 mL oral liquid: 10 milliliter(s) orally every 4 hours, As needed, Cough  melatonin 3 mg oral tablet: 1 tab(s) orally once a day (at bedtime)  rivaroxaban 10 mg oral tablet: 1 tab(s) orally once a day  Symbicort 160 mcg-4.5 mcg/inh inhalation aerosol: 2 puff(s) inhaled 2 times a day

## 2021-03-19 NOTE — DISCHARGE NOTE PROVIDER - NSDCCPCAREPLAN_GEN_ALL_CORE_FT
PRINCIPAL DISCHARGE DIAGNOSIS  Diagnosis: Acute hypoxemic respiratory failure due to COVID-19  Assessment and Plan of Treatment: Utilize oxygen as prescribed and instructed.  You tested positive for COVID 19.  You no longer require hospitalization.  Please restrict activities outside of your home except for getting medical care.  Do not go to work, school, or public areas.  Avoid using public transportation, ride-sharing, or taxis.  Separate yourself from other people and animals in your home.  Call ahead before visiting your doctor.  Wear a facemask when you are around other people. Cover your cough and sneezes.  Clean your hands often.  Avoid sharing personal household items.  Clean all frequently touched surfaces daily.      SECONDARY DISCHARGE DIAGNOSES  Diagnosis: Chronic obstructive pulmonary disease, unspecified COPD type  Assessment and Plan of Treatment: Participate in physical activity as tolerated.  Seek immediate medical attention if the shortness of breath does not improve with asthma treatments, or if you experience chest pain or palpitations.  Call an ambulance if your lips or nail beds become a bluish color.      Diagnosis: RENUKA treated with BiPAP  Assessment and Plan of Treatment: Utilize BIPAP machine as prescribed.    Diagnosis: Prophylactic measure  Assessment and Plan of Treatment: Xarelto has been prescribed for prevention of blood clots associated with COVID-19.  As a blood thinner, Xarelto can result in abnormal bleeding and brushing.  Be mindful to avoid accidental trauma.   If you develop new leg pain, swelling, and/or redness contact your doctor, as this may be signs of a blood clot.  Notify your doctor if you have worrisome bruising or bleeding.     PRINCIPAL DISCHARGE DIAGNOSIS  Diagnosis: Acute hypoxemic respiratory failure due to COVID-19  Assessment and Plan of Treatment: Utilize oxygen as follows:  2 liters per minute when resting/sitting.  3 liters/minute when walking.  Utilize the pulse oxymeter (provided) to monitor your oxygen periodically, and whenever you are walking around.  Follow up with your Pulmonologist within 1 week.  *************  You tested positive for COVID 19.  You no longer require hospitalization.  Please restrict activities outside of your home except for getting medical care.  Do not go to work, school, or public areas.  Avoid using public transportation, ride-sharing, or taxis.  Separate yourself from other people and animals in your home.  Call ahead before visiting your doctor.  Wear a facemask when you are around other people. Cover your cough and sneezes.  Clean your hands often.  Avoid sharing personal household items.  Clean all frequently touched surfaces daily.      SECONDARY DISCHARGE DIAGNOSES  Diagnosis: Chronic obstructive pulmonary disease, unspecified COPD type  Assessment and Plan of Treatment: Participate in physical activity as tolerated.  Seek immediate medical attention if the shortness of breath does not improve with asthma treatments, or if you experience chest pain or palpitations.  Call an ambulance if your lips or nail beds become a bluish color.      Diagnosis: RENUKA treated with BiPAP  Assessment and Plan of Treatment: Utilize BIPAP machine as prescribed.    Diagnosis: Prophylactic measure  Assessment and Plan of Treatment: Xarelto has been prescribed for prevention of blood clots associated with COVID-19.  As a blood thinner, Xarelto can result in abnormal bleeding and brushing.  Be mindful to avoid accidental trauma.   If you develop new leg pain, swelling, and/or redness contact your doctor, as this may be signs of a blood clot.  Notify your doctor if you have worrisome bruising or bleeding.

## 2021-03-19 NOTE — CHART NOTE - NSCHARTNOTEFT_GEN_A_CORE
Patient requires home O2 due to hypoxia as a result of coronavirus PNA in order to increase hospital capacity and mitigate risk.  Resting O2 saturation on room air was 85%.      Izabella Curran NP  [phone]

## 2021-03-19 NOTE — DISCHARGE NOTE PROVIDER - DETAILS OF MALNUTRITION DIAGNOSIS/DIAGNOSES
This patient has been assessed with a concern for Malnutrition and was treated during this hospitalization for the following Nutrition diagnosis/diagnoses:     -  03/17/2021: Morbid obesity (BMI > 40)   This patient has been assessed with a concern for Malnutrition and was treated during this hospitalization for the following Nutrition diagnosis/diagnoses:     -  03/17/2021: Morbid obesity (BMI > 40)    This patient has been assessed with a concern for Malnutrition and was treated during this hospitalization for the following Nutrition diagnosis/diagnoses:     -  03/17/2021: Morbid obesity (BMI > 40)   This patient has been assessed with a concern for Malnutrition and was treated during this hospitalization for the following Nutrition diagnosis/diagnoses:     -  03/17/2021: Morbid obesity (BMI > 40)    This patient has been assessed with a concern for Malnutrition and was treated during this hospitalization for the following Nutrition diagnosis/diagnoses:     -  03/17/2021: Morbid obesity (BMI > 40)    This patient has been assessed with a concern for Malnutrition and was treated during this hospitalization for the following Nutrition diagnosis/diagnoses:     -  03/17/2021: Morbid obesity (BMI > 40)   This patient has been assessed with a concern for Malnutrition and was treated during this hospitalization for the following Nutrition diagnosis/diagnoses:     -  03/17/2021: Morbid obesity (BMI > 40)    This patient has been assessed with a concern for Malnutrition and was treated during this hospitalization for the following Nutrition diagnosis/diagnoses:     -  03/17/2021: Morbid obesity (BMI > 40)    This patient has been assessed with a concern for Malnutrition and was treated during this hospitalization for the following Nutrition diagnosis/diagnoses:     -  03/17/2021: Morbid obesity (BMI > 40)    This patient has been assessed with a concern for Malnutrition and was treated during this hospitalization for the following Nutrition diagnosis/diagnoses:     -  03/17/2021: Morbid obesity (BMI > 40)   This patient has been assessed with a concern for Malnutrition and was treated during this hospitalization for the following Nutrition diagnosis/diagnoses:     -  03/17/2021: Morbid obesity (BMI > 40)    This patient has been assessed with a concern for Malnutrition and was treated during this hospitalization for the following Nutrition diagnosis/diagnoses:     -  03/17/2021: Morbid obesity (BMI > 40)    This patient has been assessed with a concern for Malnutrition and was treated during this hospitalization for the following Nutrition diagnosis/diagnoses:     -  03/17/2021: Morbid obesity (BMI > 40)    This patient has been assessed with a concern for Malnutrition and was treated during this hospitalization for the following Nutrition diagnosis/diagnoses:     -  03/17/2021: Morbid obesity (BMI > 40)    This patient has been assessed with a concern for Malnutrition and was treated during this hospitalization for the following Nutrition diagnosis/diagnoses:     -  03/17/2021: Morbid obesity (BMI > 40)

## 2021-03-19 NOTE — PROGRESS NOTE ADULT - PROBLEM SELECTOR PLAN 4
Given current illness will continue O2 via NC for now. If breathing worsens or hypoxia appear primarily nocturnal would consider bipap use qhs.   Asked Pulmonary Dr. Horan and group to follow. Given current illness will continue O2 via NC for now. If breathing worsens or hypoxia appear primarily nocturnal would consider bipap use qhs.   Asked Pulmonary Dr. Horan and group to follow. Needs 2.0 lpm oxygen at discharge, desats with ambulation.

## 2021-03-19 NOTE — DISCHARGE NOTE PROVIDER - CARE PROVIDER_API CALL
Immanuel Nunez)  Internal Medicine  [address] [id number]  Phone: [phone]  Fax: [phone]  Follow Up Time:     Mario Alberto Navarrete)  Critical Care Medicine; Internal Medicine; Pulmonary Disease  [address] [facility], [address] [place] [address]  Phone: [phone]  Fax: [phone]  Follow Up Time:

## 2021-03-19 NOTE — PROGRESS NOTE ADULT - PROBLEM SELECTOR PLAN 1
Pt with acute hypoxemic respiratory failure 2/2 COVID19. Continue decadron 6mg x 9 more days, agree with IV Remdizivir for 5 day course. LE venous dopplers negative. Decrease Lovenox 40 mg bid. Morbid obesity is significant risk factor. 130 kg is weight. CTA negative for PE. Discharge home Friday afternoon with oxygen. Pt with acute hypoxemic respiratory failure 2/2 COVID19. Continue decadron 6mg x 9 more days, agree with IV Remdizivir for 5 day course. LE venous dopplers negative. Decrease Lovenox 40 mg bid. Morbid obesity is significant risk factor. 130 kg is weight. CTA negative for PE. Discharge home Friday afternoon with oxygen.  Xarelto 10 mg/day for 30 days post discharge.

## 2021-03-20 ENCOUNTER — TRANSCRIPTION ENCOUNTER (OUTPATIENT)
Age: 61
End: 2021-03-20

## 2021-03-20 VITALS — RESPIRATION RATE: 18 BRPM | OXYGEN SATURATION: 88 %

## 2021-03-20 LAB
ALBUMIN SERPL ELPH-MCNC: 2.6 G/DL — LOW (ref 3.3–5)
ALP SERPL-CCNC: 50 U/L — SIGNIFICANT CHANGE UP (ref 40–120)
ALT FLD-CCNC: 28 U/L — SIGNIFICANT CHANGE UP (ref 10–45)
ANION GAP SERPL CALC-SCNC: 9 MMOL/L — SIGNIFICANT CHANGE UP (ref 5–17)
AST SERPL-CCNC: 34 U/L — SIGNIFICANT CHANGE UP (ref 10–40)
BILIRUB SERPL-MCNC: 0.3 MG/DL — SIGNIFICANT CHANGE UP (ref 0.2–1.2)
BUN SERPL-MCNC: 22 MG/DL — SIGNIFICANT CHANGE UP (ref 7–23)
CALCIUM SERPL-MCNC: 8.2 MG/DL — LOW (ref 8.4–10.5)
CHLORIDE SERPL-SCNC: 105 MMOL/L — SIGNIFICANT CHANGE UP (ref 96–108)
CO2 SERPL-SCNC: 29 MMOL/L — SIGNIFICANT CHANGE UP (ref 22–31)
CREAT SERPL-MCNC: 0.57 MG/DL — SIGNIFICANT CHANGE UP (ref 0.5–1.3)
CRP SERPL-MCNC: 0.39 MG/DL — SIGNIFICANT CHANGE UP (ref 0–0.4)
D DIMER BLD IA.RAPID-MCNC: 363 NG/ML DDU — HIGH
FERRITIN SERPL-MCNC: 101 NG/ML — SIGNIFICANT CHANGE UP (ref 15–150)
GLUCOSE BLDC GLUCOMTR-MCNC: 116 MG/DL — HIGH (ref 70–99)
GLUCOSE BLDC GLUCOMTR-MCNC: 95 MG/DL — SIGNIFICANT CHANGE UP (ref 70–99)
GLUCOSE SERPL-MCNC: 92 MG/DL — SIGNIFICANT CHANGE UP (ref 70–99)
POTASSIUM SERPL-MCNC: 4.3 MMOL/L — SIGNIFICANT CHANGE UP (ref 3.5–5.3)
POTASSIUM SERPL-SCNC: 4.3 MMOL/L — SIGNIFICANT CHANGE UP (ref 3.5–5.3)
PROCALCITONIN SERPL-MCNC: 0.09 NG/ML — SIGNIFICANT CHANGE UP (ref 0.02–0.1)
PROT SERPL-MCNC: 5.9 G/DL — LOW (ref 6–8.3)
SODIUM SERPL-SCNC: 143 MMOL/L — SIGNIFICANT CHANGE UP (ref 135–145)

## 2021-03-20 PROCEDURE — 85379 FIBRIN DEGRADATION QUANT: CPT

## 2021-03-20 PROCEDURE — 85730 THROMBOPLASTIN TIME PARTIAL: CPT

## 2021-03-20 PROCEDURE — 82550 ASSAY OF CK (CPK): CPT

## 2021-03-20 PROCEDURE — 82962 GLUCOSE BLOOD TEST: CPT

## 2021-03-20 PROCEDURE — 85027 COMPLETE CBC AUTOMATED: CPT

## 2021-03-20 PROCEDURE — 85610 PROTHROMBIN TIME: CPT

## 2021-03-20 PROCEDURE — 85025 COMPLETE CBC W/AUTO DIFF WBC: CPT

## 2021-03-20 PROCEDURE — U0003: CPT

## 2021-03-20 PROCEDURE — 83036 HEMOGLOBIN GLYCOSYLATED A1C: CPT

## 2021-03-20 PROCEDURE — 71045 X-RAY EXAM CHEST 1 VIEW: CPT

## 2021-03-20 PROCEDURE — 86140 C-REACTIVE PROTEIN: CPT

## 2021-03-20 PROCEDURE — 96374 THER/PROPH/DIAG INJ IV PUSH: CPT

## 2021-03-20 PROCEDURE — U0005: CPT

## 2021-03-20 PROCEDURE — 80053 COMPREHEN METABOLIC PANEL: CPT

## 2021-03-20 PROCEDURE — 99285 EMERGENCY DEPT VISIT HI MDM: CPT

## 2021-03-20 PROCEDURE — 83735 ASSAY OF MAGNESIUM: CPT

## 2021-03-20 PROCEDURE — 71275 CT ANGIOGRAPHY CHEST: CPT

## 2021-03-20 PROCEDURE — 93970 EXTREMITY STUDY: CPT

## 2021-03-20 PROCEDURE — 80061 LIPID PANEL: CPT

## 2021-03-20 PROCEDURE — 86803 HEPATITIS C AB TEST: CPT

## 2021-03-20 PROCEDURE — 80048 BASIC METABOLIC PNL TOTAL CA: CPT

## 2021-03-20 PROCEDURE — 84145 PROCALCITONIN (PCT): CPT

## 2021-03-20 PROCEDURE — 84100 ASSAY OF PHOSPHORUS: CPT

## 2021-03-20 PROCEDURE — 80076 HEPATIC FUNCTION PANEL: CPT

## 2021-03-20 PROCEDURE — 94640 AIRWAY INHALATION TREATMENT: CPT

## 2021-03-20 PROCEDURE — 86769 SARS-COV-2 COVID-19 ANTIBODY: CPT

## 2021-03-20 PROCEDURE — 82728 ASSAY OF FERRITIN: CPT

## 2021-03-20 RX ADMIN — RIVAROXABAN 10 MILLIGRAM(S): KIT at 11:50

## 2021-03-20 RX ADMIN — Medication 100 MILLIGRAM(S): at 05:21

## 2021-03-20 RX ADMIN — Medication 5000 UNIT(S): at 11:50

## 2021-03-20 RX ADMIN — FAMOTIDINE 20 MILLIGRAM(S): 10 INJECTION INTRAVENOUS at 11:50

## 2021-03-20 RX ADMIN — BUDESONIDE AND FORMOTEROL FUMARATE DIHYDRATE 2 PUFF(S): 160; 4.5 AEROSOL RESPIRATORY (INHALATION) at 09:00

## 2021-03-20 RX ADMIN — Medication 6 MILLIGRAM(S): at 05:22

## 2021-03-20 RX ADMIN — ZINC SULFATE TAB 220 MG (50 MG ZINC EQUIVALENT) 220 MILLIGRAM(S): 220 (50 ZN) TAB at 11:50

## 2021-03-20 NOTE — PROGRESS NOTE ADULT - SUBJECTIVE AND OBJECTIVE BOX
INTERVAL HPI/OVERNIGHT EVENTS:  Pt seen and examined at bedside.     Allergies/Intolerance: No Known Allergies      MEDICATIONS  (STANDING):  benzonatate 100 milliGRAM(s) Oral every 8 hours  budesonide 160 MICROgram(s)/formoterol 4.5 MICROgram(s) Inhaler 2 Puff(s) Inhalation two times a day  cholecalciferol 5000 Unit(s) Oral daily  dexAMETHasone     Tablet 6 milliGRAM(s) Oral daily  famotidine    Tablet 20 milliGRAM(s) Oral daily  melatonin 3 milliGRAM(s) Oral at bedtime  rivaroxaban 10 milliGRAM(s) Oral daily  zinc sulfate 220 milliGRAM(s) Oral daily    MEDICATIONS  (PRN):  acetaminophen   Tablet .. 650 milliGRAM(s) Oral every 4 hours PRN Temp greater or equal to 38.5C (101.3F)  ALBUTerol    90 MICROgram(s) HFA Inhaler 2 Puff(s) Inhalation every 6 hours PRN Shortness of Breath and/or Wheezing  guaifenesin/dextromethorphan  Syrup 10 milliLiter(s) Oral every 4 hours PRN Cough        ROS: all systems reviewed and wnl      PHYSICAL EXAMINATION:  Vital Signs Last 24 Hrs  T(C): 37.1 (20 Mar 2021 04:49), Max: 37.1 (20 Mar 2021 04:49)  T(F): 98.7 (20 Mar 2021 04:49), Max: 98.7 (20 Mar 2021 04:49)  HR: 62 (20 Mar 2021 04:49) (62 - 65)  BP: 147/78 (20 Mar 2021 04:49) (142/74 - 147/78)  BP(mean): --  RR: 18 (20 Mar 2021 09:24) (18 - 18)  SpO2: 90% (20 Mar 2021 09:24) (87% - 95%)  CAPILLARY BLOOD GLUCOSE      POCT Blood Glucose.: 116 mg/dL (20 Mar 2021 12:20)  POCT Blood Glucose.: 95 mg/dL (20 Mar 2021 08:01)  POCT Blood Glucose.: 106 mg/dL (19 Mar 2021 21:37)  POCT Blood Glucose.: 119 mg/dL (19 Mar 2021 16:33)      03-19 @ 07:01  -  03-20 @ 07:00  --------------------------------------------------------  IN: 480 mL / OUT: 0 mL / NET: 480 mL        GENERAL:   NECK: supple, No JVD  CHEST/LUNG: clear to auscultation bilaterally; no rales, rhonchi, or wheezing b/l  HEART: normal S1, S2  ABDOMEN: BS+, soft, ND, NT   EXTREMITIES:  pulses palpable; no clubbing, cyanosis, or edema b/l LEs  SKIN: no rashes or lesions      LABS:    03-20    143  |  105  |  22  ----------------------------<  92  4.3   |  29  |  0.57    Ca    8.2<L>      20 Mar 2021 05:41    TPro  5.9<L>  /  Alb  2.6<L>  /  TBili  0.3  /  DBili  x   /  AST  34  /  ALT  28  /  AlkPhos  50  03-20               INTERVAL HPI/OVERNIGHT EVENTS:  Pt seen and examined at bedside.     Allergies/Intolerance: No Known Allergies      MEDICATIONS  (STANDING):  benzonatate 100 milliGRAM(s) Oral every 8 hours  budesonide 160 MICROgram(s)/formoterol 4.5 MICROgram(s) Inhaler 2 Puff(s) Inhalation two times a day  cholecalciferol 5000 Unit(s) Oral daily  dexAMETHasone     Tablet 6 milliGRAM(s) Oral daily  famotidine    Tablet 20 milliGRAM(s) Oral daily  melatonin 3 milliGRAM(s) Oral at bedtime  rivaroxaban 10 milliGRAM(s) Oral daily  zinc sulfate 220 milliGRAM(s) Oral daily    MEDICATIONS  (PRN):  acetaminophen   Tablet .. 650 milliGRAM(s) Oral every 4 hours PRN Temp greater or equal to 38.5C (101.3F)  ALBUTerol    90 MICROgram(s) HFA Inhaler 2 Puff(s) Inhalation every 6 hours PRN Shortness of Breath and/or Wheezing  guaifenesin/dextromethorphan  Syrup 10 milliLiter(s) Oral every 4 hours PRN Cough        ROS: all systems reviewed and wnl      PHYSICAL EXAMINATION:  Vital Signs Last 24 Hrs  T(C): 37.1 (20 Mar 2021 04:49), Max: 37.1 (20 Mar 2021 04:49)  T(F): 98.7 (20 Mar 2021 04:49), Max: 98.7 (20 Mar 2021 04:49)  HR: 62 (20 Mar 2021 04:49) (62 - 65)  BP: 147/78 (20 Mar 2021 04:49) (142/74 - 147/78)  BP(mean): --  RR: 18 (20 Mar 2021 09:24) (18 - 18)  SpO2: 90% (20 Mar 2021 09:24) (87% - 95%)  CAPILLARY BLOOD GLUCOSE      POCT Blood Glucose.: 116 mg/dL (20 Mar 2021 12:20)  POCT Blood Glucose.: 95 mg/dL (20 Mar 2021 08:01)  POCT Blood Glucose.: 106 mg/dL (19 Mar 2021 21:37)  POCT Blood Glucose.: 119 mg/dL (19 Mar 2021 16:33)      03-19 @ 07:01  -  03-20 @ 07:00  --------------------------------------------------------  IN: 480 mL / OUT: 0 mL / NET: 480 mL        GENERAL: stable, eager for discharge.   NECK: supple, No JVD  CHEST/LUNG: clear to auscultation bilaterally; no rales, rhonchi, or wheezing b/l  HEART: normal S1, S2  ABDOMEN: BS+, soft, ND, NT   EXTREMITIES:  pulses palpable; no clubbing, cyanosis, or edema b/l LEs  SKIN: no rashes or lesions      LABS:    03-20    143  |  105  |  22  ----------------------------<  92  4.3   |  29  |  0.57    Ca    8.2<L>      20 Mar 2021 05:41    TPro  5.9<L>  /  Alb  2.6<L>  /  TBili  0.3  /  DBili  x   /  AST  34  /  ALT  28  /  AlkPhos  50  03-20

## 2021-03-20 NOTE — DISCHARGE NOTE NURSING/CASE MANAGEMENT/SOCIAL WORK - PATIENT PORTAL LINK FT
You can access the FollowMyHealth Patient Portal offered by [facility] by registering at the following website: [email or web address]. By joining [employer]’s FollowMyHealth portal, you will also be able to view your health information using other applications (apps) compatible with our system.

## 2021-03-20 NOTE — PROGRESS NOTE ADULT - ASSESSMENT
59yo F w/pmhx of COPD, multiple DVTs of the LLE previously on AC but now discontinued, Obesity, RENUKA on BiPAP, who now presents with 3 day hx worsening shortness of breath and hypoxemia in the setting of recent COVID19 diagnosis
61yo F w/pmhx of COPD, multiple DVTs of the LLE previously on AC but now discontinued, Obesity, RENUKA on BiPAP, who now presents with 3 day hx worsening shortness of breath and hypoxemia in the setting of recent COVID19 diagnosis
59yo F w/pmhx of COPD, multiple DVTs of the LLE previously on AC but now discontinued, Obesity, RENUKA on BiPAP, who now presents with 3 day hx worsening shortness of breath and hypoxemia in the setting of recent COVID19 diagnosis
61yo F w/pmhx of COPD, multiple DVTs of the LLE previously on AC but now discontinued, Obesity, RENUKA on BiPAP, who now presents with 3 day hx worsening shortness of breath and hypoxemia in the setting of recent COVID19 diagnosis
59yo F w/pmhx of COPD, multiple DVTs of the LLE previously on AC but now discontinued, Obesity, RENUKA on BiPAP, who now presents with 3 day hx worsening shortness of breath and hypoxemia in the setting of recent COVID19 diagnosis
61yo F w/pmhx of COPD, multiple DVTs of the LLE previously on AC but now discontinued, Obesity, RENUKA on BiPAP, who now presents with 3 day hx worsening shortness of breath and hypoxemia in the setting of recent COVID19 diagnosis

## 2021-03-20 NOTE — PROGRESS NOTE ADULT - REASON FOR ADMISSION
Acute hypoxemic respiratory failure 2/2 COVID19

## 2021-03-20 NOTE — CHART NOTE - NSCHARTNOTEFT_GEN_A_CORE
Patient is going home on oxygen, per documentation, oxygen saturation at rest on two (2) liters is 94%.  During exertion, patient's oxygenation is 90% on three (3) liters of oxygen.  Of note was admitted for COVID-19 and has a history of COPD.  Patient educated to use 2 liters oxygen when at rest and increase oxygen to 3 liters during exterion/ambulation.  Patient also instructed to follow up with her Primary Care Doctor and Pulmonologist within 1 week.  Preceding d/w attending Dr. Sheridan.    Izabella Curran, DARRYL  [phone]

## 2021-03-20 NOTE — PROGRESS NOTE ADULT - NUTRITIONAL ASSESSMENT
This patient has been assessed with a concern for Malnutrition and has been determined to have a diagnosis/diagnoses of Morbid obesity (BMI > 40).    This patient is being managed with:   Diet Consistent Carbohydrate/No Snacks-  Entered: Mar 16 2021 10:09AM    

## 2021-03-20 NOTE — PROGRESS NOTE ADULT - PROBLEM SELECTOR PLAN 4
Given current illness will continue O2 via NC for now. If breathing worsens or hypoxia appear primarily nocturnal would consider bipap use qhs.   Asked Pulmonary Dr. Horan and group to follow. Needs 2.0 lpm oxygen at discharge, desats with ambulation. Given current illness will continue O2 via NC for now. If breathing worsens or hypoxia appear primarily nocturnal would consider bipap use qhs.   Asked Pulmonary Dr. Horan and group to follow. Needs 2.0 lpm oxygen at discharge, desats with ambulation. Home oxygen is arranged.

## 2021-03-20 NOTE — PROGRESS NOTE ADULT - PROBLEM SELECTOR PROBLEM 3
History of DVT (deep vein thrombosis)

## 2021-03-20 NOTE — PROGRESS NOTE ADULT - PROBLEM SELECTOR PROBLEM 1
Acute hypoxemic respiratory failure due to COVID-19
emotional support given to patient and family

## 2021-03-20 NOTE — PROGRESS NOTE ADULT - PROBLEM SELECTOR PLAN 3
Given patient's history of multiple DVTs including when ill with respiratory issues in the past, current COVID19, use of decadron, obesity, patient is at very high risk for recurrent thrombosis. Will thus place on Lovenox 40 mg bid, Xarelto upon discharge.

## 2021-03-20 NOTE — PROGRESS NOTE ADULT - PROBLEM SELECTOR PLAN 1
Pt with acute hypoxemic respiratory failure 2/2 COVID19. Continue decadron 6mg x 9 more days, agree with IV Remdizivir for 5 day course. LE venous dopplers negative. Decrease Lovenox 40 mg bid. Morbid obesity is significant risk factor. 130 kg is weight. CTA negative for PE. Discharge home Friday afternoon with oxygen.  Xarelto 10 mg/day for 30 days post discharge. Pt with acute hypoxemic respiratory failure 2/2 COVID19. Continue decadron 6mg x 9 more days, agree with IV Remdizivir for 5 day course. LE venous dopplers negative. Decrease Lovenox 40 mg bid. Morbid obesity is significant risk factor. 130 kg is weight. CTA negative for PE. Discharge home today. Oxygen sats have improved.  Needs Xarelto to 30 days post discharge.

## 2021-03-20 NOTE — PROGRESS NOTE ADULT - PROBLEM SELECTOR PROBLEM 4
RENUKA treated with BiPAP

## 2021-03-21 ENCOUNTER — TRANSCRIPTION ENCOUNTER (OUTPATIENT)
Age: 61
End: 2021-03-21

## 2021-04-07 NOTE — DIETITIAN INITIAL EVALUATION ADULT. - ENTER FROM (ML/KG)
sub cm erythematous periclitoral vulvar lesion (short stitch superior, long stitch lateral) and point discoloration on right labia minora resected.   skin reapproximated with 3.0 vicryl in interrupted fashion. bacitracin applied to both surgical sites. 25

## 2021-04-09 ENCOUNTER — APPOINTMENT (OUTPATIENT)
Dept: PULMONOLOGY | Facility: CLINIC | Age: 61
End: 2021-04-09
Payer: COMMERCIAL

## 2021-04-09 VITALS
SYSTOLIC BLOOD PRESSURE: 158 MMHG | TEMPERATURE: 98.6 F | OXYGEN SATURATION: 80 % | DIASTOLIC BLOOD PRESSURE: 94 MMHG | HEART RATE: 102 BPM | WEIGHT: 292 LBS | BODY MASS INDEX: 45.73 KG/M2

## 2021-04-09 VITALS — HEART RATE: 84 BPM | OXYGEN SATURATION: 93 %

## 2021-04-09 DIAGNOSIS — U07.1 COVID-19: ICD-10-CM

## 2021-04-09 DIAGNOSIS — J12.82 COVID-19: ICD-10-CM

## 2021-04-09 PROCEDURE — 99213 OFFICE O/P EST LOW 20 MIN: CPT

## 2021-04-09 PROCEDURE — 99072 ADDL SUPL MATRL&STAF TM PHE: CPT

## 2021-04-09 NOTE — PROCEDURE
[FreeTextEntry1] : PFT 9/25/19: Severe obstruction noted. Mild response to bronchodilator. Mild restriction. Diffusion was moderately reduced. \par \par CT Chest 3/17/21: No PE. Ground glass opacities in PAPI and LLL.\par \par Venous U/S 3/17/21: No DVT.

## 2021-04-09 NOTE — PHYSICAL EXAM
[Well Groomed] : well groomed [III] : III [Jugular Venous Distention Increased] : there was no jugular-venous distention [Heart Sounds] : normal S1 and S2 [Auscultation Breath Sounds / Voice Sounds] : lungs were clear to auscultation bilaterally [] : no hepato-splenomegaly [Abnormal Walk] : normal gait [Skin Turgor] : normal skin turgor [No Focal Deficits] : no focal deficits [Oriented To Time, Place, And Person] : oriented to person, place, and time [Cyanosis, Localized] : no localized cyanosis [FreeTextEntry1] : Obese

## 2021-04-09 NOTE — DISCUSSION/SUMMARY
[FreeTextEntry1] : She is a 60 year-old woman with obesity, S/P gastric sleeve in the past, severe obstructive lung disease, restrictive lung disease and chronic ventilatory failure. She has been hospitalized in the past with hypercapnic respiratory failure. Polysomnography has demonstrated severe sleep-related desaturations which were rectified with non-invasive ventilation. She has not wanted to use nocturnal ventilation. Has not been able to lose weight and maintain it. She is S/P COVID 19 in March of 2021. \par \par Her COPD is stable. To continue with Symbicort and ProAir as needed. Weight loss and exercise were advised. \par \par Follow up in 6 months. Follow up CT.

## 2021-04-09 NOTE — HISTORY OF PRESENT ILLNESS
[Former] : former [TextBox_4] : Had COVID in March. Was there for one week. She was not intubated. \par \par Feels better. No cough or SOB. \par  [TextBox_13] : 2010

## 2021-04-09 NOTE — REVIEW OF SYSTEMS
[Hypertension] : ~T hypertension [Snoring] : snoring [Fever] : no fever [Ear Disturbance] : no ear disturbance [Nasal Congestion] : no nasal congestion [Epistaxis] : no nosebleeds [Postnasal Drip] : no postnasal drip [Cough] : no cough [Dyspnea] : no dyspnea [Wheezing] : no wheezing [Chest Discomfort] : no chest discomfort [PND] : no PND [Heartburn] : no heartburn [Reflux] : no reflux [Diarrhea] : no diarrhea [Dysuria] : no dysuria [Depression] : no depression [Difficulty Initiating Sleep] : no difficulty falling asleep [Hypersomnolence] : not sleeping much more than usual

## 2021-09-07 ENCOUNTER — RX RENEWAL (OUTPATIENT)
Age: 61
End: 2021-09-07

## 2021-10-06 PROBLEM — U07.1 PNEUMONIA DUE TO COVID-19 VIRUS: Status: ACTIVE | Noted: 2021-04-09

## 2021-12-14 ENCOUNTER — RX RENEWAL (OUTPATIENT)
Age: 61
End: 2021-12-14

## 2022-01-10 ENCOUNTER — RX RENEWAL (OUTPATIENT)
Age: 62
End: 2022-01-10

## 2022-02-09 ENCOUNTER — APPOINTMENT (OUTPATIENT)
Dept: PULMONOLOGY | Facility: CLINIC | Age: 62
End: 2022-02-09
Payer: COMMERCIAL

## 2022-02-09 VITALS
DIASTOLIC BLOOD PRESSURE: 96 MMHG | TEMPERATURE: 96.8 F | OXYGEN SATURATION: 91 % | BODY MASS INDEX: 48.55 KG/M2 | WEIGHT: 293 LBS | SYSTOLIC BLOOD PRESSURE: 146 MMHG | HEART RATE: 72 BPM

## 2022-02-09 DIAGNOSIS — J44.9 CHRONIC OBSTRUCTIVE PULMONARY DISEASE, UNSPECIFIED: ICD-10-CM

## 2022-02-09 PROCEDURE — 99214 OFFICE O/P EST MOD 30 MIN: CPT

## 2022-02-09 NOTE — DISCUSSION/SUMMARY
[FreeTextEntry1] : She is a 61 year-old woman with obesity, S/P gastric sleeve in the past, severe obstructive lung disease, restrictive lung disease and chronic ventilatory failure. She has been hospitalized in the past with hypercapnic respiratory failure. PSG demonstrated severe sleep-related desaturations which were rectified with non-invasive ventilation. She has not wanted to use nocturnal ventilation. She has not been able to lose weight and maintain it. She is S/P COVID 19 in March of 2021. \par \par She has increased VALENCIA. Will give a trial of Breztri. Stop Symbicort. Weight loss and exercise were advised. \par \par Follow up in three months with PFT. \par

## 2022-02-09 NOTE — REVIEW OF SYSTEMS
[Hypertension] : ~T hypertension [Snoring] : snoring [Fever] : no fever [Fatigue] : no fatigue [Ear Disturbance] : no ear disturbance [Nasal Congestion] : no nasal congestion [Epistaxis] : no nosebleeds [Postnasal Drip] : no postnasal drip [Cough] : no cough [Dyspnea] : no dyspnea [Wheezing] : no wheezing [PND] : no PND [Edema] : ~T edema was not present [Heartburn] : no heartburn [Reflux] : no reflux [Diarrhea] : no diarrhea [Dysuria] : no dysuria [Depression] : no depression [Difficulty Initiating Sleep] : no difficulty falling asleep [Hypersomnolence] : not sleeping much more than usual

## 2022-02-09 NOTE — PHYSICAL EXAM
[Well Groomed] : well groomed [III] : III [Jugular Venous Distention Increased] : there was no jugular-venous distention [Heart Sounds] : normal S1 and S2 [Auscultation Breath Sounds / Voice Sounds] : lungs were clear to auscultation bilaterally [] : no hepato-splenomegaly [Abnormal Walk] : normal gait [Cyanosis, Localized] : no localized cyanosis [Skin Turgor] : normal skin turgor [No Focal Deficits] : no focal deficits [Oriented To Time, Place, And Person] : oriented to person, place, and time [FreeTextEntry1] : Obese

## 2022-02-09 NOTE — PROCEDURE
[FreeTextEntry1] : PFT 9/25/19: Severe obstruction noted. Mild response to bronchodilator. Mild restriction. Diffusion was moderately reduced. \par \par CT Chest 3/17/21: No PE. Ground glass opacities in PAPI and LLL.\par \par CT Chest 4/17/21: No PE. Emphysematous changes noted. Patchy ground glass opacities. \par \par Venous U/S 3/17/21: No DVT.

## 2022-02-09 NOTE — HISTORY OF PRESENT ILLNESS
[Former] : former [TextBox_4] : VALENCIA when walking. \par \par Not exercising. Not able to lose weight. \par \par Has home oxygen. Not using it. Not using CPAP or NIV. Gave it back. \par \par Fully vaccinated for COVID 19 including a booster. \par \par \par \par  [TextBox_13] : 2010

## 2022-03-16 ENCOUNTER — RX RENEWAL (OUTPATIENT)
Age: 62
End: 2022-03-16

## 2022-06-07 ENCOUNTER — RX RENEWAL (OUTPATIENT)
Age: 62
End: 2022-06-07

## 2022-08-10 ENCOUNTER — APPOINTMENT (OUTPATIENT)
Dept: PULMONOLOGY | Facility: CLINIC | Age: 62
End: 2022-08-10

## 2022-08-10 VITALS
TEMPERATURE: 98.6 F | HEART RATE: 85 BPM | OXYGEN SATURATION: 89 % | WEIGHT: 293 LBS | BODY MASS INDEX: 51.37 KG/M2 | DIASTOLIC BLOOD PRESSURE: 98 MMHG | SYSTOLIC BLOOD PRESSURE: 148 MMHG

## 2022-08-10 VITALS — OXYGEN SATURATION: 91 %

## 2022-08-10 PROCEDURE — 94060 EVALUATION OF WHEEZING: CPT

## 2022-08-10 PROCEDURE — 99214 OFFICE O/P EST MOD 30 MIN: CPT | Mod: 25

## 2022-08-10 PROCEDURE — 94727 GAS DIL/WSHOT DETER LNG VOL: CPT

## 2022-08-10 PROCEDURE — 94729 DIFFUSING CAPACITY: CPT

## 2022-08-10 NOTE — DISCUSSION/SUMMARY
[FreeTextEntry1] : She is a 61 year-old woman with morbid obesity, S/P gastric sleeve in the past, severe obstructive lung disease, restrictive lung disease and chronic ventilatory failure. She has been hospitalized in the past with hypercapnic respiratory failure. PSG demonstrated severe sleep-related desaturations which were rectified with non-invasive ventilation. She does not want to use nocturnal ventilation. She has not been able to lose weight and maintain it. She had COVID in March of 2021. \par \par She has VALENCIA. Improved somewhat with Breztri. Weight loss and exercise were advised again. Advised to consider NIV. \par \par Follow up in 6 months.

## 2022-08-10 NOTE — HISTORY OF PRESENT ILLNESS
[Former] : former [Obstructive Sleep Apnea] : obstructive sleep apnea [TextBox_4] : Has been feeling SOB with the hot and humid weather.  The shortness of breath is at exertion.\par \par On Breztri and albuterol as needed. Uses albuterol several times a day.  [TextBox_13] : 2010

## 2022-08-10 NOTE — PHYSICAL EXAM
[III] : III [Jugular Venous Distention Increased] : there was no jugular-venous distention [Heart Sounds] : normal S1 and S2 [Auscultation Breath Sounds / Voice Sounds] : lungs were clear to auscultation bilaterally [] : no hepato-splenomegaly [Abnormal Walk] : normal gait [Cyanosis, Localized] : no localized cyanosis [Skin Turgor] : normal skin turgor [No Focal Deficits] : no focal deficits [Oriented To Time, Place, And Person] : oriented to person, place, and time [General Appearance - In No Acute Distress] : no acute distress [FreeTextEntry1] : Obese

## 2022-08-10 NOTE — PROCEDURE
[FreeTextEntry1] : PFT 9/25/19: Severe obstruction noted. Mild response to bronchodilator. Mild restriction. Diffusion was moderately reduced. \par \par PFT 8/10/22: Severe obstruction.  Mild restriction.  Moderate reduction in diffusion.  No significant change after bronchodilator.\par \par CT Chest 3/17/21: No PE. Ground glass opacities in PAPI and LLL.\par \par CT Chest 4/17/21: No PE. Emphysematous changes noted. Patchy ground glass opacities. \par \par Venous U/S 3/17/21: No DVT.

## 2022-08-10 NOTE — REVIEW OF SYSTEMS
[Hypertension] : ~T hypertension [Dyspnea] : dyspnea [Fever] : no fever [Fatigue] : no fatigue [Ear Disturbance] : no ear disturbance [Nasal Congestion] : no nasal congestion [Epistaxis] : no nosebleeds [Postnasal Drip] : no postnasal drip [Cough] : no cough [Sputum] : not coughing up ~M sputum [Chest Tightness] : no chest tightness [Pleuritic Pain] : no pleuritic pain [Wheezing] : no wheezing [PND] : no PND [Palpitations] : no palpitations [Edema] : ~T edema was not present [Heartburn] : no heartburn [Reflux] : no reflux [Diarrhea] : no diarrhea [Dysuria] : no dysuria [Depression] : no depression

## 2022-10-31 NOTE — PROCEDURE
[FreeTextEntry1] : PFT 9/25/19: Severe obstruction noted. Mild response to bronchodilator. Mild restriction. Diffusion was moderately reduced.  Please follow-up in the [facility] for the lower extremity injuries.     Please follow-up with Dr. Meza outpatient for upper extremity injuries.  Please follow-up in the [facility] for the lower extremity injuries.     Please follow-up with Dr. Meza outpatient for upper extremity injuries/upon discharge from the hospital in 1 week.  Please follow-up in the [facility] for the lower extremity injuries within 2 weeks from surgery date for incision care.    Please follow-up with Dr. Curtis for your hand surgeries within 2 weeks of surgery date.  Please call office to schedule appointment.

## 2023-03-11 ENCOUNTER — RX RENEWAL (OUTPATIENT)
Age: 63
End: 2023-03-11

## 2023-04-17 ENCOUNTER — RX RENEWAL (OUTPATIENT)
Age: 63
End: 2023-04-17

## 2023-05-15 ENCOUNTER — RX RENEWAL (OUTPATIENT)
Age: 63
End: 2023-05-15

## 2023-05-19 ENCOUNTER — RX RENEWAL (OUTPATIENT)
Age: 63
End: 2023-05-19

## 2023-05-25 ENCOUNTER — RX RENEWAL (OUTPATIENT)
Age: 63
End: 2023-05-25

## 2023-06-09 ENCOUNTER — APPOINTMENT (OUTPATIENT)
Dept: PULMONOLOGY | Facility: CLINIC | Age: 63
End: 2023-06-09
Payer: COMMERCIAL

## 2023-06-09 VITALS
WEIGHT: 293 LBS | BODY MASS INDEX: 50.59 KG/M2 | HEART RATE: 79 BPM | DIASTOLIC BLOOD PRESSURE: 110 MMHG | OXYGEN SATURATION: 91 % | SYSTOLIC BLOOD PRESSURE: 148 MMHG | TEMPERATURE: 97.5 F

## 2023-06-09 VITALS — OXYGEN SATURATION: 93 %

## 2023-06-09 DIAGNOSIS — E66.01 MORBID (SEVERE) OBESITY DUE TO EXCESS CALORIES: ICD-10-CM

## 2023-06-09 PROCEDURE — 99214 OFFICE O/P EST MOD 30 MIN: CPT

## 2023-06-09 NOTE — DISCUSSION/SUMMARY
[FreeTextEntry1] : She is a 62 year-old woman with morbid obesity, S/P gastric sleeve in the past, severe obstructive lung disease, restrictive lung disease and chronic ventilatory failure. Has been hospitalized with hypercapnic respiratory failure. PSG demonstrated severe sleep-related desaturations which were rectified with non-invasive ventilation. She does not want to use any nocturnal ventilation. She has not been able to lose weight and maintain it. Had COVID in March of 2021, was not hospitalized. \par \par Impression: \par Severe COPD\par - stable\par RENUKA with nocturnal desaturation\par - has declined NIV\par Elevated BMI\par \par Plan: \par To continue with Breztri\par - renewed\par Follow up CT requested\par Weight loss and exercise advisable\par Follow up in 6 months

## 2023-06-09 NOTE — REVIEW OF SYSTEMS
[Dyspnea] : dyspnea [Hypertension] : ~T hypertension [Fatigue] : no fatigue [Recent Wt Loss (___ Lbs)] : no recent weight loss [Postnasal Drip] : no postnasal drip [Dry Mouth] : no dry mouth [Cough] : no cough [Sputum] : not coughing up ~M sputum [Chest Tightness] : no chest tightness [Pleuritic Pain] : no pleuritic pain [Wheezing] : no wheezing [PND] : no PND [Palpitations] : no palpitations [Edema] : ~T edema was not present [Heartburn] : no heartburn [Reflux] : no reflux [Diarrhea] : no diarrhea [Dysuria] : no dysuria [Depression] : no depression

## 2023-06-09 NOTE — HISTORY OF PRESENT ILLNESS
[Obstructive Sleep Apnea] : obstructive sleep apnea [TextBox_4] : The patient came for a scheduled follow up visit today. On Breztri. C/O VALENCIA as before. Not physically active. Has not smoked in 10 years at least. No new medical issues.  [Difficulty Maintaining Sleep] : does not have difficulty maintaining sleep

## 2023-06-09 NOTE — PHYSICAL EXAM
[General Appearance - In No Acute Distress] : no acute distress [Jugular Venous Distention Increased] : there was no jugular-venous distention [Heart Sounds] : normal S1 and S2 [Auscultation Breath Sounds / Voice Sounds] : lungs were clear to auscultation bilaterally [Abnormal Walk] : normal gait [Cyanosis, Localized] : no localized cyanosis [Skin Turgor] : normal skin turgor [No Focal Deficits] : no focal deficits [Oriented To Time, Place, And Person] : oriented to person, place, and time [Neck Cervical Mass (___cm)] : no neck mass was observed [Heart Rate And Rhythm] : heart rate and rhythm were normal [] : no respiratory distress [Abdomen Tenderness] : non-tender [Motor Exam] : the motor exam was normal [FreeTextEntry1] : Obese [Erythema] : no erythema of the pharynx

## 2023-06-19 ENCOUNTER — APPOINTMENT (OUTPATIENT)
Dept: CT IMAGING | Facility: IMAGING CENTER | Age: 63
End: 2023-06-19
Payer: COMMERCIAL

## 2023-06-19 ENCOUNTER — OUTPATIENT (OUTPATIENT)
Dept: OUTPATIENT SERVICES | Facility: HOSPITAL | Age: 63
LOS: 1 days | End: 2023-06-19
Payer: COMMERCIAL

## 2023-06-19 DIAGNOSIS — Z90.89 ACQUIRED ABSENCE OF OTHER ORGANS: Chronic | ICD-10-CM

## 2023-06-19 DIAGNOSIS — R06.02 SHORTNESS OF BREATH: ICD-10-CM

## 2023-06-19 DIAGNOSIS — J44.9 CHRONIC OBSTRUCTIVE PULMONARY DISEASE, UNSPECIFIED: ICD-10-CM

## 2023-06-19 DIAGNOSIS — Z90.49 ACQUIRED ABSENCE OF OTHER SPECIFIED PARTS OF DIGESTIVE TRACT: Chronic | ICD-10-CM

## 2023-06-19 PROCEDURE — 71250 CT THORAX DX C-: CPT

## 2023-06-19 PROCEDURE — 71250 CT THORAX DX C-: CPT | Mod: 26

## 2023-07-03 ENCOUNTER — NON-APPOINTMENT (OUTPATIENT)
Age: 63
End: 2023-07-03

## 2023-07-06 ENCOUNTER — APPOINTMENT (OUTPATIENT)
Dept: THORACIC SURGERY | Facility: CLINIC | Age: 63
End: 2023-07-06
Payer: COMMERCIAL

## 2023-07-06 ENCOUNTER — NON-APPOINTMENT (OUTPATIENT)
Age: 63
End: 2023-07-06

## 2023-07-06 VITALS
SYSTOLIC BLOOD PRESSURE: 151 MMHG | RESPIRATION RATE: 17 BRPM | WEIGHT: 293 LBS | HEART RATE: 75 BPM | BODY MASS INDEX: 44.41 KG/M2 | OXYGEN SATURATION: 92 % | HEIGHT: 68 IN | DIASTOLIC BLOOD PRESSURE: 92 MMHG

## 2023-07-06 PROCEDURE — 99205 OFFICE O/P NEW HI 60 MIN: CPT

## 2023-07-07 NOTE — PHYSICAL EXAM
[Fully active, able to carry on all pre-disease performance without restriction] : Status 0 - Fully active, able to carry on all pre-disease performance without restriction [General Appearance - Alert] : alert [General Appearance - In No Acute Distress] : in no acute distress [Sclera] : the sclera and conjunctiva were normal [PERRL With Normal Accommodation] : pupils were equal in size, round, and reactive to light [Extraocular Movements] : extraocular movements were intact [Outer Ear] : the ears and nose were normal in appearance [Oropharynx] : the oropharynx was normal [Neck Appearance] : the appearance of the neck was normal [Neck Cervical Mass (___cm)] : no neck mass was observed [Jugular Venous Distention Increased] : there was no jugular-venous distention [Thyroid Diffuse Enlargement] : the thyroid was not enlarged [Thyroid Nodule] : there were no palpable thyroid nodules [Auscultation Breath Sounds / Voice Sounds] : lungs were clear to auscultation bilaterally [Heart Rate And Rhythm] : heart rate was normal and rhythm regular [Heart Sounds] : normal S1 and S2 [Heart Sounds Gallop] : no gallops [Murmurs] : no murmurs [Heart Sounds Pericardial Friction Rub] : no pericardial rub [Examination Of The Chest] : the chest was normal in appearance [Chest Visual Inspection Thoracic Asymmetry] : no chest asymmetry [Diminished Respiratory Excursion] : normal chest expansion [2+] : right 2+ [No Abnormalities] : the abdominal aorta was not enlarged and no bruit was heard [Bowel Sounds] : normal bowel sounds [Abdomen Soft] : soft [Abdomen Tenderness] : non-tender [Abdomen Mass (___ Cm)] : no abdominal mass palpated [Cervical Lymph Nodes Enlarged Posterior Bilaterally] : posterior cervical [Cervical Lymph Nodes Enlarged Anterior Bilaterally] : anterior cervical [Supraclavicular Lymph Nodes Enlarged Bilaterally] : supraclavicular [No CVA Tenderness] : no ~M costovertebral angle tenderness [No Spinal Tenderness] : no spinal tenderness [Abnormal Walk] : normal gait [Nail Clubbing] : no clubbing  or cyanosis of the fingernails [Musculoskeletal - Swelling] : no joint swelling seen [Motor Tone] : muscle strength and tone were normal [Skin Color & Pigmentation] : normal skin color and pigmentation [Skin Turgor] : normal skin turgor [] : no rash [Deep Tendon Reflexes (DTR)] : deep tendon reflexes were 2+ and symmetric [Sensation] : the sensory exam was normal to light touch and pinprick [No Focal Deficits] : no focal deficits [Oriented To Time, Place, And Person] : oriented to person, place, and time [Impaired Insight] : insight and judgment were intact [Affect] : the affect was normal [Right Carotid Bruit] : no bruit heard over the right carotid [Left Carotid Bruit] : no bruit heard over the left carotid [Right Femoral Bruit] : no bruit heard over the right femoral artery [Left Femoral Bruit] : no bruit heard over the left femoral artery

## 2023-07-07 NOTE — ASSESSMENT
[FreeTextEntry1] : Ms. TORI PAREKH, 62 year old female, former smoker, w/ hx of COPD followed by Pulm Dr. Mario Alberto Navarrete, who presented with goiter, referred by Dr. Navarrete.\par \par PFTs on 8/10/2022: FVC 59%, FEV1 36%, DLCO 48%.\par \par CT Chest on 6/19/23:\par - Mass effect on the trachea with subsequent narrowing and leftward deviation again seen, with associated increased luminal narrowing of the distal trachea with most narrowed dimension 6 measuring 1.1 x 0.6 cm (AP by transverse) as compared to prior exam where the most narrow luminal dimension was 2.1 x 0.8 cm.\par - No endobronchial lesions. Stable emphysema.\par - Interval resolution of groundglass opacities in the PAPI and LLL.\par - Stable 2 mm RML nodule adjacent to the fissure (2-74).\par - Micronodules in both lower lobes (2-94 the right and 2-90 on the left).\par - Subsegmental atelectasis in the lingula, unchanged.\par - No suspicious pulmonary nodule or mass. No evidence pneumonia.\par - No pleural effusion.\par - Numerous small nonspecific subcentimeter mediastinal lymph nodes, unchanged.\par - Gastric sleeve surgery. \par - Tiny hiatal hernia, unchanged.\par - Interval decreased size of cyst within the left hepatic lobe.\par - Large cyst in the upper pole of the left kidney, unchanged.\par \par I have reviewed the patient's medical records and diagnostic images at time of this office consultation and have made the following recommendation:\par 1. CT scan reviewed, large goiter noted, I referred patient to Dr. Efra Ball for resection of goiter, will be scheduling a joint case with Dr. Ball (my part will be stand by for possible sternal split). All clearances per Dr. Ball's office.\par \par \par I, LINA Hernandez, personally performed the evaluation and management (E/M) services for this established patient who presents today with (a) new problem(s)/exacerbation of (an) existing condition(s). That E/M includes conducting the examination, assessing all new/exacerbated conditions, and establishing a new plan of care. Today, my ACP, Flor Sesay NP was here to observe my evaluation and management services for this new problem/exacerbated condition to be followed going forward.\par \par

## 2023-07-07 NOTE — CONSULT LETTER
[Consult Letter:] : I had the pleasure of evaluating your patient, [unfilled]. [( Thank you for referring [unfilled] for consultation for _____ )] : Thank you for referring [unfilled] for consultation for [unfilled] [Please see my note below.] : Please see my note below. [Consult Closing:] : Thank you very much for allowing me to participate in the care of this patient.  If you have any questions, please do not hesitate to contact me. [Sincerely,] : Sincerely, [FreeTextEntry2] : Dr. Mario Alberto Navarrete (Pulm/Referring) [FreeTextEntry3] : Alpesh Gomez MD, MPH \par System Director of Thoracic Surgery \par Director of Comprehensive Lung and Foregut [place] \par Professor Cardiovascular & Thoracic Surgery  \par [facility] [employer] [facility]\par \par [facility]\par [address]\par Oncology [address]\[place] [address]\par Tel: [phone]\par Fax: [phone]\par

## 2023-07-07 NOTE — HISTORY OF PRESENT ILLNESS
[FreeTextEntry1] : Ms. TORI PAREKH, 62 year old female, former smoker (1 ppd x 30 yrs, quit 2013), w/ hx of COPD followed by Pulm Dr. Mario Alberto Navarrete, who presented with goiter, referred by Dr. Navarrete.\par \par PFTs on 8/10/2022: FVC 59%, FEV1 36%, DLCO 48%.\par \par CT Chest on 6/19/23:\par - Mass effect on the trachea with subsequent narrowing and leftward deviation again seen, with associated increased luminal narrowing of the distal trachea with most narrowed dimension 6 measuring 1.1 x 0.6 cm (AP by transverse) as compared to prior exam where the most narrow luminal dimension was 2.1 x 0.8 cm.\par - No endobronchial lesions. Stable emphysema.\par - Interval resolution of groundglass opacities in the PAPI and LLL.\par - Stable 2 mm RML nodule adjacent to the fissure (2-74).\par - Micronodules in both lower lobes (2-94 the right and 2-90 on the left).\par - Subsegmental atelectasis in the lingula, unchanged.\par - No suspicious pulmonary nodule or mass. No evidence pneumonia.\par - No pleural effusion.\par - Numerous small nonspecific subcentimeter mediastinal lymph nodes, unchanged.\par - Gastric sleeve surgery. \par - Tiny hiatal hernia, unchanged.\par - Interval decreased size of cyst within the left hepatic lobe.\par - Large cyst in the upper pole of the left kidney, unchanged.\par \par Patient is here today for CT Sx consultation, referred by Dr. Navarrete. \par

## 2023-07-12 ENCOUNTER — APPOINTMENT (OUTPATIENT)
Dept: PULMONOLOGY | Facility: CLINIC | Age: 63
End: 2023-07-12
Payer: COMMERCIAL

## 2023-07-12 VITALS
OXYGEN SATURATION: 90 % | BODY MASS INDEX: 47.59 KG/M2 | WEIGHT: 293 LBS | SYSTOLIC BLOOD PRESSURE: 144 MMHG | DIASTOLIC BLOOD PRESSURE: 102 MMHG | TEMPERATURE: 97.5 F | HEART RATE: 107 BPM

## 2023-07-12 DIAGNOSIS — J45.909 UNSPECIFIED ASTHMA, UNCOMPLICATED: ICD-10-CM

## 2023-07-12 PROCEDURE — 99215 OFFICE O/P EST HI 40 MIN: CPT | Mod: 25

## 2023-07-12 PROCEDURE — 94060 EVALUATION OF WHEEZING: CPT

## 2023-07-12 NOTE — DISCUSSION/SUMMARY
[FreeTextEntry1] : She is a 62 year-old woman with morbid obesity, S/P gastric sleeve in the past, severe obstructive lung disease, restrictive lung disease and chronic ventilatory failure. Has been hospitalized with hypercapnic respiratory failure. PSG demonstrated severe sleep-related desaturations which were rectified with non-invasive ventilation. She does not want to use any nocturnal ventilation. She has not been able to lose weight and maintain it. Had COVID in March of 2021, was not hospitalized. \par \par Impression: \par Goiter with airway compression\par Severe COPD\par - stable\par RENUKA\par Elevated BMI\par \par Plan: \par To continue with Breztri\par Weight loss and exercise advisable\par PSG\par Head and neck surgery evaluation

## 2023-07-12 NOTE — PHYSICAL EXAM
[General Appearance - In No Acute Distress] : no acute distress [Neck Cervical Mass (___cm)] : no neck mass was observed [Jugular Venous Distention Increased] : there was no jugular-venous distention [Heart Rate And Rhythm] : heart rate and rhythm were normal [Heart Sounds] : normal S1 and S2 [Auscultation Breath Sounds / Voice Sounds] : lungs were clear to auscultation bilaterally [Abdomen Tenderness] : non-tender [] : no hepato-splenomegaly [Abnormal Walk] : normal gait [Cyanosis, Localized] : no localized cyanosis [Skin Turgor] : normal skin turgor [Motor Exam] : the motor exam was normal [No Focal Deficits] : no focal deficits [Oriented To Time, Place, And Person] : oriented to person, place, and time [FreeTextEntry1] : Obese [Erythema] : no erythema of the pharynx

## 2023-07-12 NOTE — REVIEW OF SYSTEMS
[Dyspnea] : dyspnea [Hypertension] : ~T hypertension [Fever] : no fever [Fatigue] : no fatigue [Recent Wt Loss (___ Lbs)] : no recent weight loss [Postnasal Drip] : no postnasal drip [Dry Mouth] : no dry mouth [Cough] : no cough [Sputum] : not coughing up ~M sputum [Hemoptysis] : no hemoptysis [Chest Tightness] : no chest tightness [Wheezing] : no wheezing [Chest Discomfort] : no chest discomfort [PND] : no PND [Palpitations] : no palpitations [Edema] : ~T edema was not present [Heartburn] : no heartburn [Reflux] : no reflux [Diarrhea] : no diarrhea [Dysuria] : no dysuria [Depression] : no depression

## 2023-07-12 NOTE — PROCEDURE
[FreeTextEntry1] : PFT 9/25/19: Severe obstruction noted. Mild response to bronchodilator. Mild restriction. Diffusion was moderately reduced. \par \par PFT 8/10/22: Severe obstruction.  Mild restriction.  Moderate reduction in diffusion.  No significant change after bronchodilator.\par \par PFT 7/12/23: Severe obstruction. Spirometry mildly improved from 2022. \par \par CT Chest 3/17/21: No PE. Ground glass opacities in PAPI and LLL.\par \par CT Chest 4/17/21: No PE. Emphysematous changes noted. Patchy ground glass opacities. \par \par Venous U/S 3/17/21: No DVT.

## 2023-07-12 NOTE — HISTORY OF PRESENT ILLNESS
[Obstructive Sleep Apnea] : obstructive sleep apnea [Former] : former [TextBox_4] : Has goiter. May need surgery. \par \par Feels SOB when lying flat. Has to sleep with three pillows.  [Difficulty Maintaining Sleep] : does not have difficulty maintaining sleep

## 2023-08-03 ENCOUNTER — APPOINTMENT (OUTPATIENT)
Dept: OTOLARYNGOLOGY | Facility: CLINIC | Age: 63
End: 2023-08-03
Payer: COMMERCIAL

## 2023-08-03 VITALS
OXYGEN SATURATION: 94 % | BODY MASS INDEX: 44.41 KG/M2 | WEIGHT: 293 LBS | HEART RATE: 77 BPM | HEIGHT: 68 IN | SYSTOLIC BLOOD PRESSURE: 160 MMHG | TEMPERATURE: 98.4 F | DIASTOLIC BLOOD PRESSURE: 77 MMHG

## 2023-08-03 PROCEDURE — 31575 DIAGNOSTIC LARYNGOSCOPY: CPT

## 2023-08-03 PROCEDURE — 99204 OFFICE O/P NEW MOD 45 MIN: CPT | Mod: 25

## 2023-08-03 NOTE — REASON FOR VISIT
[Initial Consultation] : an initial consultation for [Other: _____] : [unfilled] [FreeTextEntry2] : thyroid goiter

## 2023-08-03 NOTE — PLAN
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 - CT neck showed narrowing of the trachea and patient having more MIRZA recently and neck pressure. - US in the office showed an isoechoic nodule with cystic areas. - Discussed findings and recommended total thyroidectomy with possible sternal split. - Discussed obtaining FNA pre op but since it has benign features and patient will have a total thyroidectomy we will hold off for now. - The patient was informed of the procedure, risks, benefits and alternative. The patient was given time to ask questions and agreed to proceed with the procedure. The major and more common complications discussed were injury to recurrent laryngeal nerve, hypocalcemia, airway complications, poor wound healing, infection, fistula, scar, deformity, bleeding, vascular injury, numbness, fluid collection, recurrent tumor, tumor spread and/or potential for unexpected complications. The risk of anesthesia (MI, CVA, death, airway complications, other organ injury and other potential complications) were also discussed. Tumor recurrence and metastasis was discussed.

## 2023-08-03 NOTE — HISTORY OF PRESENT ILLNESS
[de-identified] : This is a 62 yro patient with COPD referred by Dr. Gomez for surgical eval of thyroid goiter. This was found recently on CT chest done June 2023.  Pt reports dyspnea on exertion getting worse. Feels pressure in the throat. New instances of choking while sleeping/laying flat, needs to elevate the head with pillows. Reports Intermittent dysphagia with saliva and food. Pt also reports hoarse voice and throat clearing for the past few months.  No odynophagia, choking, fever, chills, or weight loss.  Patient denies h/o radiation and has no family h/o thyroid cancer. No thyroid biopsy done. Not on thyroid medications or blood thinners.  Former 1ppd cigarette smoker, quit about 10 years ago in 2013.   CT chest w/o contrast  6/19/23 ([facility]): FINDINGS:  LUNGS AND AIRWAYS: Mass effect on the trachea with subsequent narrowing and leftward deviation again seen, with associated increased luminal narrowing of the distal trachea with most narrowed dimension 6 measuring 1.1 x 0.6 cm (AP by transverse) as compared to prior exam where the most narrow luminal dimension was 2.1 x 0.8 cm. No endobronchial lesions. Stable emphysema. Interval resolution of groundglass opacities in the left upper and lower lobes. Stable 2 mm right middle lobe nodule adjacent to the fissure (2-74). Micronodules in both lower lobes (2-94 the right and 2-90 on the left). Subsegmental atelectasis in the lingula, unchanged. No suspicious pulmonary nodule or mass. No evidence pneumonia. PLEURA: No pleural effusion. MEDIASTINUM AND CAPRI: No pathologic lymphadenopathy by size criteria. Numerous small nonspecific subcentimeter mediastinal lymph nodes, unchanged. VESSELS: Within normal limits. HEART: Heart size is normal. No pericardial effusion. CHEST WALL AND LOWER NECK: Within normal limits. VISUALIZED UPPER ABDOMEN: Gastric sleeve surgery. Tiny hiatal hernia, unchanged. Interval decreased size of cyst within the left hepatic lobe. Large cyst in the upper pole of the left kidney, unchanged. BONES: Degenerative changes.  IMPRESSION: Interval resolution of left upper and lower lobe ground glass opacities. Stable emphysema and 2 mm right middle lobe pulmonary nodule, possibly an intrafissural lymph node.  Increased tracheal narrowing secondary to thyroid goiter.  Decrease size left hepatic lobe cyst.

## 2023-08-04 ENCOUNTER — APPOINTMENT (OUTPATIENT)
Dept: PULMONOLOGY | Facility: CLINIC | Age: 63
End: 2023-08-04
Payer: COMMERCIAL

## 2023-08-04 VITALS
HEART RATE: 91 BPM | SYSTOLIC BLOOD PRESSURE: 164 MMHG | TEMPERATURE: 97.8 F | DIASTOLIC BLOOD PRESSURE: 110 MMHG | OXYGEN SATURATION: 97 %

## 2023-08-04 VITALS — DIASTOLIC BLOOD PRESSURE: 100 MMHG | SYSTOLIC BLOOD PRESSURE: 148 MMHG

## 2023-08-04 DIAGNOSIS — G47.30 SLEEP APNEA, UNSPECIFIED: ICD-10-CM

## 2023-08-04 DIAGNOSIS — J44.9 CHRONIC OBSTRUCTIVE PULMONARY DISEASE, UNSPECIFIED: ICD-10-CM

## 2023-08-04 PROCEDURE — 99214 OFFICE O/P EST MOD 30 MIN: CPT | Mod: 25

## 2023-08-04 PROCEDURE — 94060 EVALUATION OF WHEEZING: CPT

## 2023-08-04 NOTE — HISTORY OF PRESENT ILLNESS
[Former] : former [Obstructive Sleep Apnea] : obstructive sleep apnea [TextBox_4] : She will be having resection of her goiter.  Complains of dyspnea on exertion as before.  No coughing or wheezing.  She is on Breztri and albuterol as needed. [Difficulty Maintaining Sleep] : does not have difficulty maintaining sleep

## 2023-08-04 NOTE — PROCEDURE
[FreeTextEntry1] : PFT 9/25/19: Severe obstruction noted. Mild response to bronchodilator. Mild restriction. Diffusion was moderately reduced.   PFT 8/10/22: Severe obstruction.  Mild restriction.  Moderate reduction in diffusion.  No significant change after bronchodilator.  PFT 7/12/23: Severe obstruction. Spirometry mildly improved from 2022.   PFT 8/4/23: Severe obstruction. No significant improvement after bronchodilator.   CT Chest 3/17/21: No PE. Ground glass opacities in PAPI and LLL.  CT Chest 4/17/21: No PE. Emphysematous changes noted. Patchy ground glass opacities.   CT Chest 6/19/23: Tracheal narrowing secondary to goiter.  Resolution of left upper lobe and lower lobe groundglass opacities.  Emphysematous changes were stable.  Venous U/S 3/17/21: No DVT.

## 2023-08-04 NOTE — REVIEW OF SYSTEMS
[Dyspnea] : dyspnea [Hypertension] : ~T hypertension [Fatigue] : no fatigue [Recent Wt Loss (___ Lbs)] : no recent weight loss [Postnasal Drip] : no postnasal drip [Dry Mouth] : no dry mouth [Cough] : no cough [Sputum] : not coughing up ~M sputum [Hemoptysis] : no hemoptysis [Chest Tightness] : no chest tightness [Wheezing] : no wheezing [Chest Discomfort] : no chest discomfort [PND] : no PND [Edema] : ~T edema was not present [Heartburn] : no heartburn [Reflux] : no reflux [Diarrhea] : no diarrhea [Dysuria] : no dysuria [Depression] : no depression

## 2023-08-04 NOTE — DISCUSSION/SUMMARY
[FreeTextEntry1] : She is a 62 year-old woman with morbid obesity, S/P gastric sleeve, severe obstructive lung disease, restrictive lung disease and chronic ventilatory failure. Has been hospitalized with hypercapnic respiratory failure. PSG demonstrated severe sleep-related desaturations which were rectified with non-invasive ventilation. She could not want to use any nocturnal ventilation. She has not been able to lose weight and maintain it. Had COVID in March of 2021, was not hospitalized.   Impression:  Goiter with airway compression noted on CT COPD - stable on Breztri and albuterol  - able to ambulate with little limitation  RENUKA with obesity hypoventilation Hypertension  Plan:  Follow up with her PCP and or Cardiology regarding her BP management To continue with Breztri and albuterol as needed  Weight loss and exercise advisable Sleep study if she will agree

## 2023-09-18 ENCOUNTER — RX RENEWAL (OUTPATIENT)
Age: 63
End: 2023-09-18

## 2023-09-28 ENCOUNTER — RX RENEWAL (OUTPATIENT)
Age: 63
End: 2023-09-28

## 2023-09-28 RX ORDER — ALBUTEROL SULFATE 90 UG/1
108 (90 BASE) INHALANT RESPIRATORY (INHALATION)
Qty: 1 | Refills: 5 | Status: ACTIVE | COMMUNITY
Start: 2020-03-25 | End: 1900-01-01

## 2023-10-05 ENCOUNTER — OUTPATIENT (OUTPATIENT)
Dept: OUTPATIENT SERVICES | Facility: HOSPITAL | Age: 63
LOS: 1 days | End: 2023-10-05
Payer: COMMERCIAL

## 2023-10-05 VITALS
SYSTOLIC BLOOD PRESSURE: 152 MMHG | RESPIRATION RATE: 16 BRPM | OXYGEN SATURATION: 99 % | HEART RATE: 79 BPM | DIASTOLIC BLOOD PRESSURE: 83 MMHG | WEIGHT: 293 LBS | HEIGHT: 66.75 IN | TEMPERATURE: 98 F

## 2023-10-05 DIAGNOSIS — Z90.49 ACQUIRED ABSENCE OF OTHER SPECIFIED PARTS OF DIGESTIVE TRACT: Chronic | ICD-10-CM

## 2023-10-05 DIAGNOSIS — Z90.3 ACQUIRED ABSENCE OF STOMACH [PART OF]: Chronic | ICD-10-CM

## 2023-10-05 DIAGNOSIS — R04.9 HEMORRHAGE FROM RESPIRATORY PASSAGES, UNSPECIFIED: ICD-10-CM

## 2023-10-05 DIAGNOSIS — E66.01 MORBID (SEVERE) OBESITY DUE TO EXCESS CALORIES: ICD-10-CM

## 2023-10-05 DIAGNOSIS — G47.33 OBSTRUCTIVE SLEEP APNEA (ADULT) (PEDIATRIC): ICD-10-CM

## 2023-10-05 DIAGNOSIS — I10 ESSENTIAL (PRIMARY) HYPERTENSION: ICD-10-CM

## 2023-10-05 DIAGNOSIS — E04.9 NONTOXIC GOITER, UNSPECIFIED: ICD-10-CM

## 2023-10-05 DIAGNOSIS — Z90.89 ACQUIRED ABSENCE OF OTHER ORGANS: Chronic | ICD-10-CM

## 2023-10-05 DIAGNOSIS — R06.09 OTHER FORMS OF DYSPNEA: ICD-10-CM

## 2023-10-05 DIAGNOSIS — J44.9 CHRONIC OBSTRUCTIVE PULMONARY DISEASE, UNSPECIFIED: ICD-10-CM

## 2023-10-05 LAB
A1C WITH ESTIMATED AVERAGE GLUCOSE RESULT: 5.8 % — HIGH (ref 4–5.6)
ALBUMIN SERPL ELPH-MCNC: 3.5 G/DL — SIGNIFICANT CHANGE UP (ref 3.3–5)
ALP SERPL-CCNC: 69 U/L — SIGNIFICANT CHANGE UP (ref 40–120)
ALT FLD-CCNC: 11 U/L — SIGNIFICANT CHANGE UP (ref 4–33)
ANION GAP SERPL CALC-SCNC: 11 MMOL/L — SIGNIFICANT CHANGE UP (ref 7–14)
AST SERPL-CCNC: 11 U/L — SIGNIFICANT CHANGE UP (ref 4–32)
BILIRUB SERPL-MCNC: 0.3 MG/DL — SIGNIFICANT CHANGE UP (ref 0.2–1.2)
BLD GP AB SCN SERPL QL: NEGATIVE — SIGNIFICANT CHANGE UP
BUN SERPL-MCNC: 23 MG/DL — SIGNIFICANT CHANGE UP (ref 7–23)
CALCIUM SERPL-MCNC: 9.2 MG/DL — SIGNIFICANT CHANGE UP (ref 8.4–10.5)
CHLORIDE SERPL-SCNC: 102 MMOL/L — SIGNIFICANT CHANGE UP (ref 98–107)
CO2 SERPL-SCNC: 27 MMOL/L — SIGNIFICANT CHANGE UP (ref 22–31)
CREAT SERPL-MCNC: 0.68 MG/DL — SIGNIFICANT CHANGE UP (ref 0.5–1.3)
EGFR: 99 ML/MIN/1.73M2 — SIGNIFICANT CHANGE UP
ESTIMATED AVERAGE GLUCOSE: 120 — SIGNIFICANT CHANGE UP
GLUCOSE SERPL-MCNC: 82 MG/DL — SIGNIFICANT CHANGE UP (ref 70–99)
HCT VFR BLD CALC: 43 % — SIGNIFICANT CHANGE UP (ref 34.5–45)
HGB BLD-MCNC: 13.3 G/DL — SIGNIFICANT CHANGE UP (ref 11.5–15.5)
MCHC RBC-ENTMCNC: 27.3 PG — SIGNIFICANT CHANGE UP (ref 27–34)
MCHC RBC-ENTMCNC: 30.9 GM/DL — LOW (ref 32–36)
MCV RBC AUTO: 88.1 FL — SIGNIFICANT CHANGE UP (ref 80–100)
NRBC # BLD: 0 /100 WBCS — SIGNIFICANT CHANGE UP (ref 0–0)
NRBC # FLD: 0 K/UL — SIGNIFICANT CHANGE UP (ref 0–0)
PLATELET # BLD AUTO: 219 K/UL — SIGNIFICANT CHANGE UP (ref 150–400)
POTASSIUM SERPL-MCNC: 4.1 MMOL/L — SIGNIFICANT CHANGE UP (ref 3.5–5.3)
POTASSIUM SERPL-SCNC: 4.1 MMOL/L — SIGNIFICANT CHANGE UP (ref 3.5–5.3)
PROT SERPL-MCNC: 7.2 G/DL — SIGNIFICANT CHANGE UP (ref 6–8.3)
RBC # BLD: 4.88 M/UL — SIGNIFICANT CHANGE UP (ref 3.8–5.2)
RBC # FLD: 13.4 % — SIGNIFICANT CHANGE UP (ref 10.3–14.5)
RH IG SCN BLD-IMP: POSITIVE — SIGNIFICANT CHANGE UP
SODIUM SERPL-SCNC: 140 MMOL/L — SIGNIFICANT CHANGE UP (ref 135–145)
WBC # BLD: 10.97 K/UL — HIGH (ref 3.8–10.5)
WBC # FLD AUTO: 10.97 K/UL — HIGH (ref 3.8–10.5)

## 2023-10-05 PROCEDURE — 93010 ELECTROCARDIOGRAM REPORT: CPT

## 2023-10-05 RX ORDER — ALBUTEROL 90 UG/1
0 AEROSOL, METERED ORAL
Qty: 0 | Refills: 1 | DISCHARGE

## 2023-10-05 RX ORDER — SODIUM CHLORIDE 9 MG/ML
1000 INJECTION, SOLUTION INTRAVENOUS
Refills: 0 | Status: DISCONTINUED | OUTPATIENT
Start: 2023-10-11 | End: 2023-10-12

## 2023-10-05 NOTE — H&P PST ADULT - PROBLEM SELECTOR PLAN 3
Patient instructed to continue all inhalers as prescribed.   Recent PFT results Patient instructed to continue all inhalers as prescribed.   Recent PFT results, and last pulmonary notes in chart.

## 2023-10-05 NOTE — H&P PST ADULT - PROBLEM SELECTOR PLAN 2
Patient instructed to take losartan/HCTZ with a sip of water on the morning of procedure. Patient verbalized understanding.

## 2023-10-05 NOTE — H&P PST ADULT - PROBLEM SELECTOR PLAN 4
patient with c/o VALENCIA and multiple co morbidities    patient scheduled for Cardiac CTA on 10/9/23 - Copy of results requested.   recent Echo results requested   Patient scheduled for medical evaluation and cardiology -  Copy requested.

## 2023-10-05 NOTE — H&P PST ADULT - HISTORY OF PRESENT ILLNESS
63 year old female with PMH of HTN, HLD, Type 2 DM, hypothyroidism, Asthma, COPD , CAD, presents to Presurgical testing with diagnosis of nontoxic goiter unspecified scheduled for thyroidectomy substernal thyroid cervical approach   Dr Betts to add codes  63 year old female with PMH of HTN, , COPD , prediabetic, RENUKA denies use of CPAP presents to Presurgical testing with diagnosis of nontoxic goiter unspecified scheduled for thyroidectomy substernal thyroid cervical approach   Dr Betts to add codes

## 2023-10-05 NOTE — H&P PST ADULT - PRIMARY CARE PROVIDER
Dr Nunez (PCP)   [phone]                                                                                        Dr Mario Alberto Navarrete (pulmonary) [phone]

## 2023-10-05 NOTE — H&P PST ADULT - NSICDXPASTSURGICALHX_GEN_ALL_CORE_FT
PAST SURGICAL HISTORY:  H/O gastric sleeve     Status post cholecystectomy In 10/2015.    Status post tonsillectomy Childhood - age 6yo.

## 2023-10-05 NOTE — H&P PST ADULT - PROBLEM SELECTOR PLAN 1
Patient tentatively scheduled for thyroidectomy substernal thyroid cervical approach   Dr Betts to add codes on 10/11/23.  Pre-op instructions provided. Pt given verbal and written instructions with teach back on chlorhexidine wash and pepcid. Pt verbalized understanding with return demonstration.

## 2023-10-10 ENCOUNTER — TRANSCRIPTION ENCOUNTER (OUTPATIENT)
Age: 63
End: 2023-10-10

## 2023-10-10 NOTE — ASU PATIENT PROFILE, ADULT - FALL HARM RISK - UNIVERSAL INTERVENTIONS
Bed in lowest position, wheels locked, appropriate side rails in place/Call bell, personal items and telephone in reach/Instruct patient to call for assistance before getting out of bed or chair/Non-slip footwear when patient is out of bed/[place] to call system/Physically safe environment - no spills, clutter or unnecessary equipment/Purposeful Proactive Rounding/Room/bathroom lighting operational, light cord in reach

## 2023-10-11 ENCOUNTER — INPATIENT (INPATIENT)
Facility: HOSPITAL | Age: 63
LOS: 2 days | Discharge: HOME | End: 2023-10-14
Attending: OTOLARYNGOLOGY | Admitting: THORACIC SURGERY (CARDIOTHORACIC VASCULAR SURGERY)
Payer: COMMERCIAL

## 2023-10-11 ENCOUNTER — RESULT REVIEW (OUTPATIENT)
Age: 63
End: 2023-10-11

## 2023-10-11 ENCOUNTER — APPOINTMENT (OUTPATIENT)
Dept: THORACIC SURGERY | Facility: HOSPITAL | Age: 63
End: 2023-10-11

## 2023-10-11 ENCOUNTER — APPOINTMENT (OUTPATIENT)
Dept: OTOLARYNGOLOGY | Facility: HOSPITAL | Age: 63
End: 2023-10-11

## 2023-10-11 VITALS
OXYGEN SATURATION: 97 % | SYSTOLIC BLOOD PRESSURE: 120 MMHG | DIASTOLIC BLOOD PRESSURE: 67 MMHG | TEMPERATURE: 98 F | HEIGHT: 66.75 IN | RESPIRATION RATE: 16 BRPM | HEART RATE: 77 BPM | WEIGHT: 293 LBS

## 2023-10-11 DIAGNOSIS — Z90.49 ACQUIRED ABSENCE OF OTHER SPECIFIED PARTS OF DIGESTIVE TRACT: Chronic | ICD-10-CM

## 2023-10-11 DIAGNOSIS — Z90.3 ACQUIRED ABSENCE OF STOMACH [PART OF]: Chronic | ICD-10-CM

## 2023-10-11 DIAGNOSIS — E04.9 NONTOXIC GOITER, UNSPECIFIED: ICD-10-CM

## 2023-10-11 DIAGNOSIS — Z90.89 ACQUIRED ABSENCE OF OTHER ORGANS: Chronic | ICD-10-CM

## 2023-10-11 LAB
ALBUMIN SERPL ELPH-MCNC: 3.1 G/DL — LOW (ref 3.3–5)
ANION GAP SERPL CALC-SCNC: 10 MMOL/L — SIGNIFICANT CHANGE UP (ref 7–14)
ANION GAP SERPL CALC-SCNC: 9 MMOL/L — SIGNIFICANT CHANGE UP (ref 7–14)
BUN SERPL-MCNC: 18 MG/DL — SIGNIFICANT CHANGE UP (ref 7–23)
BUN SERPL-MCNC: 19 MG/DL — SIGNIFICANT CHANGE UP (ref 7–23)
CALCIUM SERPL-MCNC: 8.2 MG/DL — LOW (ref 8.4–10.5)
CALCIUM SERPL-MCNC: 8.6 MG/DL — SIGNIFICANT CHANGE UP (ref 8.4–10.5)
CHLORIDE SERPL-SCNC: 103 MMOL/L — SIGNIFICANT CHANGE UP (ref 98–107)
CHLORIDE SERPL-SCNC: 104 MMOL/L — SIGNIFICANT CHANGE UP (ref 98–107)
CO2 SERPL-SCNC: 25 MMOL/L — SIGNIFICANT CHANGE UP (ref 22–31)
CO2 SERPL-SCNC: 26 MMOL/L — SIGNIFICANT CHANGE UP (ref 22–31)
CREAT SERPL-MCNC: 0.69 MG/DL — SIGNIFICANT CHANGE UP (ref 0.5–1.3)
CREAT SERPL-MCNC: 0.74 MG/DL — SIGNIFICANT CHANGE UP (ref 0.5–1.3)
EGFR: 92 ML/MIN/1.73M2 — SIGNIFICANT CHANGE UP
EGFR: 99 ML/MIN/1.73M2 — SIGNIFICANT CHANGE UP
GLUCOSE SERPL-MCNC: 179 MG/DL — HIGH (ref 70–99)
GLUCOSE SERPL-MCNC: 183 MG/DL — HIGH (ref 70–99)
MAGNESIUM SERPL-MCNC: 1.9 MG/DL — SIGNIFICANT CHANGE UP (ref 1.6–2.6)
MAGNESIUM SERPL-MCNC: 1.9 MG/DL — SIGNIFICANT CHANGE UP (ref 1.6–2.6)
PHOSPHATE SERPL-MCNC: 4 MG/DL — SIGNIFICANT CHANGE UP (ref 2.5–4.5)
PHOSPHATE SERPL-MCNC: 4.2 MG/DL — SIGNIFICANT CHANGE UP (ref 2.5–4.5)
POTASSIUM SERPL-MCNC: 3.9 MMOL/L — SIGNIFICANT CHANGE UP (ref 3.5–5.3)
POTASSIUM SERPL-MCNC: 4.1 MMOL/L — SIGNIFICANT CHANGE UP (ref 3.5–5.3)
POTASSIUM SERPL-SCNC: 3.9 MMOL/L — SIGNIFICANT CHANGE UP (ref 3.5–5.3)
POTASSIUM SERPL-SCNC: 4.1 MMOL/L — SIGNIFICANT CHANGE UP (ref 3.5–5.3)
PTH-INTACT FLD-MCNC: 35 PG/ML — SIGNIFICANT CHANGE UP (ref 15–65)
SODIUM SERPL-SCNC: 138 MMOL/L — SIGNIFICANT CHANGE UP (ref 135–145)
SODIUM SERPL-SCNC: 139 MMOL/L — SIGNIFICANT CHANGE UP (ref 135–145)

## 2023-10-11 PROCEDURE — 88307 TISSUE EXAM BY PATHOLOGIST: CPT | Mod: 26

## 2023-10-11 PROCEDURE — 60271 REMOVAL OF THYROID: CPT

## 2023-10-11 DEVICE — LIGATING CLIPS WECK HORIZON MEDIUM (BLUE) 24: Type: IMPLANTABLE DEVICE | Status: FUNCTIONAL

## 2023-10-11 DEVICE — LIGATING CLIPS WECK HORIZON SMALL-WIDE (RED) 24: Type: IMPLANTABLE DEVICE | Status: FUNCTIONAL

## 2023-10-11 RX ORDER — ACETAMINOPHEN 500 MG
650 TABLET ORAL EVERY 6 HOURS
Refills: 0 | Status: DISCONTINUED | OUTPATIENT
Start: 2023-10-11 | End: 2023-10-13

## 2023-10-11 RX ORDER — OXYCODONE HYDROCHLORIDE 5 MG/1
5 TABLET ORAL
Refills: 0 | Status: DISCONTINUED | OUTPATIENT
Start: 2023-10-11 | End: 2023-10-13

## 2023-10-11 RX ORDER — ONDANSETRON 8 MG/1
4 TABLET, FILM COATED ORAL ONCE
Refills: 0 | Status: DISCONTINUED | OUTPATIENT
Start: 2023-10-11 | End: 2023-10-11

## 2023-10-11 RX ORDER — OXYCODONE HYDROCHLORIDE 5 MG/1
5 TABLET ORAL ONCE
Refills: 0 | Status: DISCONTINUED | OUTPATIENT
Start: 2023-10-11 | End: 2023-10-11

## 2023-10-11 RX ORDER — LEVOTHYROXINE SODIUM 125 MCG
200 TABLET ORAL DAILY
Refills: 0 | Status: DISCONTINUED | OUTPATIENT
Start: 2023-10-12 | End: 2023-10-14

## 2023-10-11 RX ORDER — ALBUTEROL 90 UG/1
2 AEROSOL, METERED ORAL
Refills: 0 | DISCHARGE

## 2023-10-11 RX ORDER — FENTANYL CITRATE 50 UG/ML
50 INJECTION INTRAVENOUS
Refills: 0 | Status: DISCONTINUED | OUTPATIENT
Start: 2023-10-11 | End: 2023-10-11

## 2023-10-11 RX ORDER — LOSARTAN/HYDROCHLOROTHIAZIDE 100MG-25MG
1 TABLET ORAL
Refills: 0 | DISCHARGE

## 2023-10-11 RX ADMIN — OXYCODONE HYDROCHLORIDE 5 MILLIGRAM(S): 5 TABLET ORAL at 17:00

## 2023-10-11 RX ADMIN — OXYCODONE HYDROCHLORIDE 5 MILLIGRAM(S): 5 TABLET ORAL at 16:30

## 2023-10-11 NOTE — ASU PREOP CHECKLIST - STERILIZATION AFFIRMATION
ANIMAL BITE - Discharge Care           Animal Bite    WHAT YOU NEED TO KNOW:    Animal bite injuries range from shallow cuts to deep, life-threatening wounds. An animal can cut or puncture the skin when it bites. Your skin may be torn from your body. Your skin may swell or bruise even if the bite does not break the skin. Animal bites occur more often on the hands, arms, legs, and face. Bites from dogs and cats are the most common injuries.    DISCHARGE INSTRUCTIONS:    Seek care immediately if:   •You have a fever.      •Your wound is red, swollen, and draining pus.      •You see red streaks on the skin around the wound.      •You can no longer move the bitten area.      •Your heartbeat and breathing are much faster than usual.      •You feel dizzy and confused.      Contact your healthcare provider if:   •Your pain does not get better, even after you take pain medicine.      •You have nightmares or flashbacks about the animal bite.       •You have questions or concerns about your condition or care.      Medicines: You may need any of the following:   •Antibiotics prevent or treat a bacterial infection.      •Prescription pain medicine may be given. Ask how to take this medicine safely.      •A tetanus vaccine may be needed to prevent tetanus. Tetanus is a life-threatening bacterial infection that affects the nerves and muscles. The bacteria can be spread through animal bites.       •A rabies vaccine may be needed to prevent rabies. Rabies is a life-threatening viral infection. The virus can be spread through animal bites.      •Take your medicine as directed. Contact your healthcare provider if you think your medicine is not helping or if you have side effects. Tell him or her if you are allergic to any medicine. Keep a list of the medicines, vitamins, and herbs you take. Include the amounts, and when and why you take them. Bring the list or the pill bottles to follow-up visits. Carry your medicine list with you in case of an emergency.      Follow up with your healthcare provider in 1 to 2 days: You may need to return to have your stitches removed. Write down your questions so you remember to ask them during your visits.    Self-care:   •Apply antibiotic ointment as directed. This helps prevent infection in minor skin wounds. It is available without a doctor's order.      •Keep the wound clean and covered. Wash the wound every day with soap and water or germ-killing cleanser. Ask your healthcare provider about the kinds of bandages to use.       •Apply ice on your wound. Ice helps decrease swelling and pain. Ice may also help prevent tissue damage. Use an ice pack, or put crushed ice in a plastic bag. Cover it with a towel and place it on your wound for 15 to 20 minutes every hour or as directed.      •Elevate the wound area. Raise your wound above the level of your heart as often as you can. This will help decrease swelling and pain. Prop your wound on pillows or blankets to keep it elevated comfortably.       Prevent another animal bite:   •Learn to recognize the signs of a scared or angry pet. Avoid quick, sudden movements.      •Do not step between animals that are fighting.      •Do not leave a pet alone with a young child.      •Do not disturb an animal while it eats, sleeps, or cares for its young.      •Do not approach an animal you do not know, especially one that is tied up or caged.      •Stay away from animals that seem sick or act strangely.      •Do not feed or capture wild animals.       Rest. Drink plenty of fluids.  Take Augmentin as directed.  Follow up with Dr. Wu on Wednesday.
n/a

## 2023-10-11 NOTE — PATIENT PROFILE ADULT - FALL HARM RISK - HARM RISK INTERVENTIONS

## 2023-10-12 ENCOUNTER — APPOINTMENT (OUTPATIENT)
Dept: CT IMAGING | Facility: CLINIC | Age: 63
End: 2023-10-12

## 2023-10-12 ENCOUNTER — TRANSCRIPTION ENCOUNTER (OUTPATIENT)
Age: 63
End: 2023-10-12

## 2023-10-12 LAB
ANION GAP SERPL CALC-SCNC: 8 MMOL/L — SIGNIFICANT CHANGE UP (ref 7–14)
ANION GAP SERPL CALC-SCNC: 9 MMOL/L — SIGNIFICANT CHANGE UP (ref 7–14)
BUN SERPL-MCNC: 18 MG/DL — SIGNIFICANT CHANGE UP (ref 7–23)
BUN SERPL-MCNC: 19 MG/DL — SIGNIFICANT CHANGE UP (ref 7–23)
CALCIUM SERPL-MCNC: 7.6 MG/DL — LOW (ref 8.4–10.5)
CALCIUM SERPL-MCNC: 8 MG/DL — LOW (ref 8.4–10.5)
CHLORIDE SERPL-SCNC: 102 MMOL/L — SIGNIFICANT CHANGE UP (ref 98–107)
CHLORIDE SERPL-SCNC: 104 MMOL/L — SIGNIFICANT CHANGE UP (ref 98–107)
CO2 SERPL-SCNC: 27 MMOL/L — SIGNIFICANT CHANGE UP (ref 22–31)
CO2 SERPL-SCNC: 27 MMOL/L — SIGNIFICANT CHANGE UP (ref 22–31)
CREAT SERPL-MCNC: 0.69 MG/DL — SIGNIFICANT CHANGE UP (ref 0.5–1.3)
CREAT SERPL-MCNC: 0.69 MG/DL — SIGNIFICANT CHANGE UP (ref 0.5–1.3)
EGFR: 99 ML/MIN/1.73M2 — SIGNIFICANT CHANGE UP
EGFR: 99 ML/MIN/1.73M2 — SIGNIFICANT CHANGE UP
GLUCOSE SERPL-MCNC: 101 MG/DL — HIGH (ref 70–99)
GLUCOSE SERPL-MCNC: 140 MG/DL — HIGH (ref 70–99)
HCT VFR BLD CALC: 39.1 % — SIGNIFICANT CHANGE UP (ref 34.5–45)
HGB BLD-MCNC: 12.3 G/DL — SIGNIFICANT CHANGE UP (ref 11.5–15.5)
MAGNESIUM SERPL-MCNC: 1.8 MG/DL — SIGNIFICANT CHANGE UP (ref 1.6–2.6)
MAGNESIUM SERPL-MCNC: 1.9 MG/DL — SIGNIFICANT CHANGE UP (ref 1.6–2.6)
MCHC RBC-ENTMCNC: 27.5 PG — SIGNIFICANT CHANGE UP (ref 27–34)
MCHC RBC-ENTMCNC: 31.5 GM/DL — LOW (ref 32–36)
MCV RBC AUTO: 87.5 FL — SIGNIFICANT CHANGE UP (ref 80–100)
NRBC # BLD: 0 /100 WBCS — SIGNIFICANT CHANGE UP (ref 0–0)
NRBC # FLD: 0 K/UL — SIGNIFICANT CHANGE UP (ref 0–0)
PHOSPHATE SERPL-MCNC: 4 MG/DL — SIGNIFICANT CHANGE UP (ref 2.5–4.5)
PHOSPHATE SERPL-MCNC: 4.3 MG/DL — SIGNIFICANT CHANGE UP (ref 2.5–4.5)
PLATELET # BLD AUTO: 202 K/UL — SIGNIFICANT CHANGE UP (ref 150–400)
POTASSIUM SERPL-MCNC: 3.9 MMOL/L — SIGNIFICANT CHANGE UP (ref 3.5–5.3)
POTASSIUM SERPL-MCNC: 4 MMOL/L — SIGNIFICANT CHANGE UP (ref 3.5–5.3)
POTASSIUM SERPL-SCNC: 3.9 MMOL/L — SIGNIFICANT CHANGE UP (ref 3.5–5.3)
POTASSIUM SERPL-SCNC: 4 MMOL/L — SIGNIFICANT CHANGE UP (ref 3.5–5.3)
PTH-INTACT FLD-MCNC: 21 PG/ML — SIGNIFICANT CHANGE UP (ref 15–65)
RBC # BLD: 4.47 M/UL — SIGNIFICANT CHANGE UP (ref 3.8–5.2)
RBC # FLD: 13.2 % — SIGNIFICANT CHANGE UP (ref 10.3–14.5)
SODIUM SERPL-SCNC: 137 MMOL/L — SIGNIFICANT CHANGE UP (ref 135–145)
SODIUM SERPL-SCNC: 140 MMOL/L — SIGNIFICANT CHANGE UP (ref 135–145)
WBC # BLD: 12.16 K/UL — HIGH (ref 3.8–10.5)
WBC # FLD AUTO: 12.16 K/UL — HIGH (ref 3.8–10.5)

## 2023-10-12 RX ORDER — CALCITRIOL 0.5 UG/1
0.25 CAPSULE ORAL
Refills: 0 | Status: DISCONTINUED | OUTPATIENT
Start: 2023-10-12 | End: 2023-10-14

## 2023-10-12 RX ORDER — BENZOCAINE AND MENTHOL 5; 1 G/100ML; G/100ML
1 LIQUID ORAL THREE TIMES A DAY
Refills: 0 | Status: DISCONTINUED | OUTPATIENT
Start: 2023-10-12 | End: 2023-10-14

## 2023-10-12 RX ORDER — CALCIUM CARBONATE 500(1250)
1 TABLET ORAL EVERY 8 HOURS
Refills: 0 | Status: DISCONTINUED | OUTPATIENT
Start: 2023-10-12 | End: 2023-10-15

## 2023-10-12 RX ADMIN — OXYCODONE HYDROCHLORIDE 5 MILLIGRAM(S): 5 TABLET ORAL at 16:20

## 2023-10-12 RX ADMIN — BENZOCAINE AND MENTHOL 1 LOZENGE: 5; 1 LIQUID ORAL at 21:54

## 2023-10-12 RX ADMIN — Medication 1 TABLET(S): at 21:53

## 2023-10-12 RX ADMIN — Medication 200 MICROGRAM(S): at 05:31

## 2023-10-12 RX ADMIN — Medication 1 TABLET(S): at 09:18

## 2023-10-12 RX ADMIN — Medication 1 TABLET(S): at 13:31

## 2023-10-12 RX ADMIN — CALCITRIOL 0.25 MICROGRAM(S): 0.5 CAPSULE ORAL at 17:32

## 2023-10-12 RX ADMIN — CALCITRIOL 0.25 MICROGRAM(S): 0.5 CAPSULE ORAL at 04:12

## 2023-10-12 NOTE — DISCHARGE NOTE PROVIDER - NSDCMRMEDTOKEN_GEN_ALL_CORE_FT
Albuterol (Eqv-ProAir HFA) 90 mcg/inh inhalation aerosol: 2 puff(s) inhaled as needed for  shortness of breath and/or wheezing  Breztri Aerosphere 160 mcg-9 mcg-4.8 mcg/inh inhalation aerosol: 2 puff(s) inhaled 2 times a day  losartan-hydrochlorothiazide 50 mg-12.5 mg oral tablet: 1 tab(s) orally once a day

## 2023-10-12 NOTE — DISCHARGE NOTE PROVIDER - NSDCFUADDINST_GEN_ALL_CORE_FT
Wound Care: Keep the incision site clean and dry, do not get wet for at least 48 hours (2 days). Can shower after 48 hours. Let water run over incision site, pat dry, do not scrub.   Keep steri strips (white stickers) to remain in place, will fall off on there own  Pain Control: OTC Tylenol 650mg every 6 hours for pain. Do not take more than 4000mg of medication in 24 hour period. Oxycodone every 6 hours as needed for severe pain. Take stool softener while taking oxycodone as can cause constipation  Activity: No heavy lifting or strenuous exercise for 2-4 weeks.   Diet: no restrictions, advance as tolerated.  Follow up with Dr. Ball as scheduled

## 2023-10-12 NOTE — DISCHARGE NOTE PROVIDER - HOSPITAL COURSE
S/P total thyroidectomy. Calcium levels were trended until stable. Patient was started on Rocaltrol 0.25 bid and Calcium 1250 tid. IRINA drain was kept in place for prevent seroma formation and Output was monitored. IRINA was removed on 9/13/23. Patient was cleared for discharge. 63 year old female with substernal thyroid goiter S/P total thyroidectomy on 10/11/2023. Had IRINA drains that were monitored for output to prevent seroma formation. IRINA drains were removed prior to discharge on 10/16/2023. Calcium and PTH levels are monitored post-operatively, stable. Tolerating regular PO diet, pain is controlled. Synthroid and calsium repletions were started. Patient was cleared for discharge home By Dr. Ball on 10/16/2023. All prescriptions were sent to a pharmacy that was agreed on with the patient.

## 2023-10-12 NOTE — DISCHARGE NOTE PROVIDER - CARE PROVIDER_API CALL
Efra Ball [facility]  Otolaryngology  [address] [place] [id number]  Phone: [phone]  Fax: [phone]  Follow Up Time:

## 2023-10-12 NOTE — DISCHARGE NOTE PROVIDER - NSDCFUSCHEDAPPT_GEN_ALL_CORE_FT
[facility] [address] [place] [address] [phone]    [address], [address]  [place] [address] [phone]    [facility] [address] [place] [address] [phone]   Efra Ball  [facility]  OTOLARYNG [address] [facility]  Scheduled Appointment: 11/02/2023

## 2023-10-13 LAB
ALBUMIN SERPL ELPH-MCNC: 2.1 G/DL — LOW (ref 3.3–5)
ANION GAP SERPL CALC-SCNC: 10 MMOL/L — SIGNIFICANT CHANGE UP (ref 7–14)
BUN SERPL-MCNC: 21 MG/DL — SIGNIFICANT CHANGE UP (ref 7–23)
CALCIUM SERPL-MCNC: 7.6 MG/DL — LOW (ref 8.4–10.5)
CALCIUM SERPL-MCNC: 7.7 MG/DL — LOW (ref 8.4–10.5)
CHLORIDE SERPL-SCNC: 102 MMOL/L — SIGNIFICANT CHANGE UP (ref 98–107)
CO2 SERPL-SCNC: 30 MMOL/L — SIGNIFICANT CHANGE UP (ref 22–31)
CREAT SERPL-MCNC: 0.75 MG/DL — SIGNIFICANT CHANGE UP (ref 0.5–1.3)
EGFR: 91 ML/MIN/1.73M2 — SIGNIFICANT CHANGE UP
GLUCOSE SERPL-MCNC: 95 MG/DL — SIGNIFICANT CHANGE UP (ref 70–99)
HCT VFR BLD CALC: 38.6 % — SIGNIFICANT CHANGE UP (ref 34.5–45)
HGB BLD-MCNC: 11.9 G/DL — SIGNIFICANT CHANGE UP (ref 11.5–15.5)
MAGNESIUM SERPL-MCNC: 1.6 MG/DL — SIGNIFICANT CHANGE UP (ref 1.6–2.6)
MCHC RBC-ENTMCNC: 27.2 PG — SIGNIFICANT CHANGE UP (ref 27–34)
MCHC RBC-ENTMCNC: 30.8 GM/DL — LOW (ref 32–36)
MCV RBC AUTO: 88.3 FL — SIGNIFICANT CHANGE UP (ref 80–100)
NRBC # BLD: 0 /100 WBCS — SIGNIFICANT CHANGE UP (ref 0–0)
NRBC # FLD: 0 K/UL — SIGNIFICANT CHANGE UP (ref 0–0)
PHOSPHATE SERPL-MCNC: 3.2 MG/DL — SIGNIFICANT CHANGE UP (ref 2.5–4.5)
PLATELET # BLD AUTO: 202 K/UL — SIGNIFICANT CHANGE UP (ref 150–400)
POTASSIUM SERPL-MCNC: 4 MMOL/L — SIGNIFICANT CHANGE UP (ref 3.5–5.3)
POTASSIUM SERPL-SCNC: 4 MMOL/L — SIGNIFICANT CHANGE UP (ref 3.5–5.3)
RBC # BLD: 4.37 M/UL — SIGNIFICANT CHANGE UP (ref 3.8–5.2)
RBC # FLD: 13.6 % — SIGNIFICANT CHANGE UP (ref 10.3–14.5)
SODIUM SERPL-SCNC: 142 MMOL/L — SIGNIFICANT CHANGE UP (ref 135–145)
WBC # BLD: 9.9 K/UL — SIGNIFICANT CHANGE UP (ref 3.8–10.5)
WBC # FLD AUTO: 9.9 K/UL — SIGNIFICANT CHANGE UP (ref 3.8–10.5)

## 2023-10-13 RX ORDER — LOSARTAN POTASSIUM 100 MG/1
50 TABLET, FILM COATED ORAL DAILY
Refills: 0 | Status: DISCONTINUED | OUTPATIENT
Start: 2023-10-13 | End: 2023-10-14

## 2023-10-13 RX ORDER — NALOXONE HYDROCHLORIDE 4 MG/.1ML
0.4 SPRAY NASAL ONCE
Refills: 0 | Status: DISCONTINUED | OUTPATIENT
Start: 2023-10-13 | End: 2023-10-14

## 2023-10-13 RX ORDER — OXYCODONE HYDROCHLORIDE 5 MG/1
10 TABLET ORAL EVERY 4 HOURS
Refills: 0 | Status: DISCONTINUED | OUTPATIENT
Start: 2023-10-13 | End: 2023-10-14

## 2023-10-13 RX ORDER — MAGNESIUM SULFATE 500 MG/ML
1 VIAL (ML) INJECTION ONCE
Refills: 0 | Status: COMPLETED | OUTPATIENT
Start: 2023-10-13 | End: 2023-10-13

## 2023-10-13 RX ORDER — POLYETHYLENE GLYCOL 3350 17 G/17G
17 POWDER, FOR SOLUTION ORAL DAILY
Refills: 0 | Status: DISCONTINUED | OUTPATIENT
Start: 2023-10-13 | End: 2023-10-14

## 2023-10-13 RX ORDER — OXYCODONE HYDROCHLORIDE 5 MG/1
5 TABLET ORAL EVERY 4 HOURS
Refills: 0 | Status: DISCONTINUED | OUTPATIENT
Start: 2023-10-13 | End: 2023-10-14

## 2023-10-13 RX ORDER — ACETAMINOPHEN 500 MG
1000 TABLET ORAL EVERY 8 HOURS
Refills: 0 | Status: DISCONTINUED | OUTPATIENT
Start: 2023-10-13 | End: 2023-10-14

## 2023-10-13 RX ORDER — SENNA PLUS 8.6 MG/1
2 TABLET ORAL AT BEDTIME
Refills: 0 | Status: DISCONTINUED | OUTPATIENT
Start: 2023-10-13 | End: 2023-10-14

## 2023-10-13 RX ORDER — ALBUTEROL 90 UG/1
2 AEROSOL, METERED ORAL EVERY 6 HOURS
Refills: 0 | Status: DISCONTINUED | OUTPATIENT
Start: 2023-10-13 | End: 2023-10-14

## 2023-10-13 RX ADMIN — Medication 1 TABLET(S): at 14:56

## 2023-10-13 RX ADMIN — OXYCODONE HYDROCHLORIDE 5 MILLIGRAM(S): 5 TABLET ORAL at 14:59

## 2023-10-13 RX ADMIN — Medication 200 MICROGRAM(S): at 06:25

## 2023-10-13 RX ADMIN — CALCITRIOL 0.25 MICROGRAM(S): 0.5 CAPSULE ORAL at 17:33

## 2023-10-13 RX ADMIN — BENZOCAINE AND MENTHOL 1 LOZENGE: 5; 1 LIQUID ORAL at 14:55

## 2023-10-13 RX ADMIN — OXYCODONE HYDROCHLORIDE 5 MILLIGRAM(S): 5 TABLET ORAL at 15:30

## 2023-10-13 RX ADMIN — BENZOCAINE AND MENTHOL 1 LOZENGE: 5; 1 LIQUID ORAL at 21:10

## 2023-10-13 RX ADMIN — Medication 100 GRAM(S): at 14:56

## 2023-10-13 RX ADMIN — Medication 1000 MILLIGRAM(S): at 21:39

## 2023-10-13 RX ADMIN — Medication 1 TABLET(S): at 21:10

## 2023-10-13 RX ADMIN — CALCITRIOL 0.25 MICROGRAM(S): 0.5 CAPSULE ORAL at 06:25

## 2023-10-13 RX ADMIN — LOSARTAN POTASSIUM 50 MILLIGRAM(S): 100 TABLET, FILM COATED ORAL at 10:27

## 2023-10-13 RX ADMIN — Medication 1 TABLET(S): at 06:25

## 2023-10-13 RX ADMIN — Medication 1000 MILLIGRAM(S): at 21:09

## 2023-10-13 RX ADMIN — BENZOCAINE AND MENTHOL 1 LOZENGE: 5; 1 LIQUID ORAL at 06:24

## 2023-10-14 LAB
ALBUMIN SERPL ELPH-MCNC: 3 G/DL — LOW (ref 3.3–5)
ANION GAP SERPL CALC-SCNC: 7 MMOL/L — SIGNIFICANT CHANGE UP (ref 7–14)
BUN SERPL-MCNC: 20 MG/DL — SIGNIFICANT CHANGE UP (ref 7–23)
CALCIUM SERPL-MCNC: 7.2 MG/DL — LOW (ref 8.4–10.5)
CHLORIDE SERPL-SCNC: 102 MMOL/L — SIGNIFICANT CHANGE UP (ref 98–107)
CO2 SERPL-SCNC: 30 MMOL/L — SIGNIFICANT CHANGE UP (ref 22–31)
CREAT SERPL-MCNC: 0.77 MG/DL — SIGNIFICANT CHANGE UP (ref 0.5–1.3)
EGFR: 88 ML/MIN/1.73M2 — SIGNIFICANT CHANGE UP
GLUCOSE SERPL-MCNC: 101 MG/DL — HIGH (ref 70–99)
HCT VFR BLD CALC: 37.1 % — SIGNIFICANT CHANGE UP (ref 34.5–45)
HGB BLD-MCNC: 11.9 G/DL — SIGNIFICANT CHANGE UP (ref 11.5–15.5)
MAGNESIUM SERPL-MCNC: 1.6 MG/DL — SIGNIFICANT CHANGE UP (ref 1.6–2.6)
MCHC RBC-ENTMCNC: 27.8 PG — SIGNIFICANT CHANGE UP (ref 27–34)
MCHC RBC-ENTMCNC: 32.1 GM/DL — SIGNIFICANT CHANGE UP (ref 32–36)
MCV RBC AUTO: 86.7 FL — SIGNIFICANT CHANGE UP (ref 80–100)
NRBC # BLD: 0 /100 WBCS — SIGNIFICANT CHANGE UP (ref 0–0)
NRBC # FLD: 0 K/UL — SIGNIFICANT CHANGE UP (ref 0–0)
PHOSPHATE SERPL-MCNC: 4.4 MG/DL — SIGNIFICANT CHANGE UP (ref 2.5–4.5)
PLATELET # BLD AUTO: 195 K/UL — SIGNIFICANT CHANGE UP (ref 150–400)
POTASSIUM SERPL-MCNC: 3.6 MMOL/L — SIGNIFICANT CHANGE UP (ref 3.5–5.3)
POTASSIUM SERPL-SCNC: 3.6 MMOL/L — SIGNIFICANT CHANGE UP (ref 3.5–5.3)
RBC # BLD: 4.28 M/UL — SIGNIFICANT CHANGE UP (ref 3.8–5.2)
RBC # FLD: 13.5 % — SIGNIFICANT CHANGE UP (ref 10.3–14.5)
SODIUM SERPL-SCNC: 139 MMOL/L — SIGNIFICANT CHANGE UP (ref 135–145)
WBC # BLD: 8.66 K/UL — SIGNIFICANT CHANGE UP (ref 3.8–10.5)
WBC # FLD AUTO: 8.66 K/UL — SIGNIFICANT CHANGE UP (ref 3.8–10.5)

## 2023-10-14 RX ADMIN — CALCITRIOL 0.25 MICROGRAM(S): 0.5 CAPSULE ORAL at 05:34

## 2023-10-14 RX ADMIN — Medication 1000 MILLIGRAM(S): at 05:35

## 2023-10-14 RX ADMIN — Medication 1000 MILLIGRAM(S): at 21:13

## 2023-10-14 RX ADMIN — ALBUTEROL 2 PUFF(S): 90 AEROSOL, METERED ORAL at 10:20

## 2023-10-14 RX ADMIN — Medication 1 TABLET(S): at 21:13

## 2023-10-14 RX ADMIN — BENZOCAINE AND MENTHOL 1 LOZENGE: 5; 1 LIQUID ORAL at 21:13

## 2023-10-14 RX ADMIN — LOSARTAN POTASSIUM 50 MILLIGRAM(S): 100 TABLET, FILM COATED ORAL at 05:34

## 2023-10-14 RX ADMIN — Medication 1 TABLET(S): at 13:07

## 2023-10-14 RX ADMIN — Medication 1 TABLET(S): at 05:34

## 2023-10-14 RX ADMIN — Medication 1000 MILLIGRAM(S): at 06:05

## 2023-10-14 RX ADMIN — CALCITRIOL 0.25 MICROGRAM(S): 0.5 CAPSULE ORAL at 17:36

## 2023-10-14 RX ADMIN — Medication 1000 MILLIGRAM(S): at 13:37

## 2023-10-14 RX ADMIN — Medication 200 MICROGRAM(S): at 05:34

## 2023-10-14 RX ADMIN — Medication 1000 MILLIGRAM(S): at 13:07

## 2023-10-14 RX ADMIN — BENZOCAINE AND MENTHOL 1 LOZENGE: 5; 1 LIQUID ORAL at 05:34

## 2023-10-14 RX ADMIN — Medication 1000 MILLIGRAM(S): at 21:43

## 2023-10-14 RX ADMIN — BENZOCAINE AND MENTHOL 1 LOZENGE: 5; 1 LIQUID ORAL at 13:08

## 2023-10-15 LAB
ALBUMIN SERPL ELPH-MCNC: 2.9 G/DL — LOW (ref 3.3–5)
ALP SERPL-CCNC: 67 U/L — SIGNIFICANT CHANGE UP (ref 40–120)
ALT FLD-CCNC: 22 U/L — SIGNIFICANT CHANGE UP (ref 4–33)
ANION GAP SERPL CALC-SCNC: 11 MMOL/L — SIGNIFICANT CHANGE UP (ref 7–14)
AST SERPL-CCNC: 16 U/L — SIGNIFICANT CHANGE UP (ref 4–32)
BILIRUB SERPL-MCNC: 0.2 MG/DL — SIGNIFICANT CHANGE UP (ref 0.2–1.2)
BUN SERPL-MCNC: 16 MG/DL — SIGNIFICANT CHANGE UP (ref 7–23)
CALCIUM SERPL-MCNC: 7.4 MG/DL — LOW (ref 8.4–10.5)
CHLORIDE SERPL-SCNC: 102 MMOL/L — SIGNIFICANT CHANGE UP (ref 98–107)
CO2 SERPL-SCNC: 28 MMOL/L — SIGNIFICANT CHANGE UP (ref 22–31)
CREAT SERPL-MCNC: 0.66 MG/DL — SIGNIFICANT CHANGE UP (ref 0.5–1.3)
EGFR: 100 ML/MIN/1.73M2 — SIGNIFICANT CHANGE UP
GLUCOSE SERPL-MCNC: 104 MG/DL — HIGH (ref 70–99)
POTASSIUM SERPL-MCNC: 3.7 MMOL/L — SIGNIFICANT CHANGE UP (ref 3.5–5.3)
POTASSIUM SERPL-SCNC: 3.7 MMOL/L — SIGNIFICANT CHANGE UP (ref 3.5–5.3)
PROT SERPL-MCNC: 6 G/DL — SIGNIFICANT CHANGE UP (ref 6–8.3)
SODIUM SERPL-SCNC: 141 MMOL/L — SIGNIFICANT CHANGE UP (ref 135–145)

## 2023-10-15 RX ORDER — CALCIUM CARBONATE 500(1250)
2 TABLET ORAL THREE TIMES A DAY
Refills: 0 | Status: DISCONTINUED | OUTPATIENT
Start: 2023-10-15 | End: 2023-10-14

## 2023-10-15 RX ADMIN — Medication 2 TABLET(S): at 21:26

## 2023-10-15 RX ADMIN — Medication 1000 MILLIGRAM(S): at 14:05

## 2023-10-15 RX ADMIN — POLYETHYLENE GLYCOL 3350 17 GRAM(S): 17 POWDER, FOR SOLUTION ORAL at 14:05

## 2023-10-15 RX ADMIN — BENZOCAINE AND MENTHOL 1 LOZENGE: 5; 1 LIQUID ORAL at 05:12

## 2023-10-15 RX ADMIN — Medication 1000 MILLIGRAM(S): at 05:41

## 2023-10-15 RX ADMIN — LOSARTAN POTASSIUM 50 MILLIGRAM(S): 100 TABLET, FILM COATED ORAL at 05:13

## 2023-10-15 RX ADMIN — Medication 1000 MILLIGRAM(S): at 14:35

## 2023-10-15 RX ADMIN — Medication 2 TABLET(S): at 05:13

## 2023-10-15 RX ADMIN — Medication 2 TABLET(S): at 03:10

## 2023-10-15 RX ADMIN — BENZOCAINE AND MENTHOL 1 LOZENGE: 5; 1 LIQUID ORAL at 21:25

## 2023-10-15 RX ADMIN — BENZOCAINE AND MENTHOL 1 LOZENGE: 5; 1 LIQUID ORAL at 14:05

## 2023-10-15 RX ADMIN — Medication 200 MICROGRAM(S): at 05:12

## 2023-10-15 RX ADMIN — CALCITRIOL 0.25 MICROGRAM(S): 0.5 CAPSULE ORAL at 05:14

## 2023-10-15 RX ADMIN — Medication 1000 MILLIGRAM(S): at 05:11

## 2023-10-15 RX ADMIN — Medication 2 TABLET(S): at 14:06

## 2023-10-15 RX ADMIN — Medication 1000 MILLIGRAM(S): at 21:56

## 2023-10-15 RX ADMIN — CALCITRIOL 0.25 MICROGRAM(S): 0.5 CAPSULE ORAL at 17:19

## 2023-10-15 RX ADMIN — Medication 1000 MILLIGRAM(S): at 21:26

## 2023-10-16 ENCOUNTER — TRANSCRIPTION ENCOUNTER (OUTPATIENT)
Age: 63
End: 2023-10-16

## 2023-10-16 VITALS
HEART RATE: 76 BPM | DIASTOLIC BLOOD PRESSURE: 75 MMHG | TEMPERATURE: 99 F | SYSTOLIC BLOOD PRESSURE: 151 MMHG | OXYGEN SATURATION: 93 % | RESPIRATION RATE: 18 BRPM

## 2023-10-16 RX ORDER — LEVOTHYROXINE SODIUM 125 MCG
1 TABLET ORAL
Qty: 30 | Refills: 0
Start: 2023-10-16 | End: 2023-11-14

## 2023-10-16 RX ORDER — CALCIUM CARBONATE 500(1250)
2 TABLET ORAL
Qty: 180 | Refills: 0
Start: 2023-10-16 | End: 2023-11-14

## 2023-10-16 RX ORDER — OXYCODONE HYDROCHLORIDE 5 MG/1
1 TABLET ORAL
Qty: 0 | Refills: 0 | DISCHARGE
Start: 2023-10-16

## 2023-10-16 RX ORDER — CALCITRIOL 0.5 UG/1
1 CAPSULE ORAL
Qty: 60 | Refills: 0
Start: 2023-10-16 | End: 2023-11-14

## 2023-10-16 RX ORDER — OXYCODONE HYDROCHLORIDE 5 MG/1
1 TABLET ORAL
Qty: 8 | Refills: 0
Start: 2023-10-16 | End: 2023-10-17

## 2023-10-16 RX ORDER — ACETAMINOPHEN 500 MG
2 TABLET ORAL
Qty: 40 | Refills: 0
Start: 2023-10-16 | End: 2023-10-20

## 2023-10-16 RX ADMIN — Medication 200 MICROGRAM(S): at 05:17

## 2023-10-16 RX ADMIN — Medication 1000 MILLIGRAM(S): at 05:45

## 2023-10-16 RX ADMIN — CALCITRIOL 0.25 MICROGRAM(S): 0.5 CAPSULE ORAL at 05:18

## 2023-10-16 RX ADMIN — Medication 1000 MILLIGRAM(S): at 05:15

## 2023-10-16 RX ADMIN — Medication 2 TABLET(S): at 05:16

## 2023-10-16 RX ADMIN — BENZOCAINE AND MENTHOL 1 LOZENGE: 5; 1 LIQUID ORAL at 05:16

## 2023-10-16 RX ADMIN — LOSARTAN POTASSIUM 50 MILLIGRAM(S): 100 TABLET, FILM COATED ORAL at 05:17

## 2023-10-16 NOTE — PROGRESS NOTE ADULT - SUBJECTIVE AND OBJECTIVE BOX
INTERVAL HX:    S/p total thyroid. IRINA output 100. Tolerating diet. PO tolerated.      Vital Signs Last 24 Hrs  T(C): 37 (12 Oct 2023 06:00), Max: 37 (11 Oct 2023 17:00)  T(F): 98.6 (12 Oct 2023 06:00), Max: 98.6 (11 Oct 2023 17:00)  HR: 68 (12 Oct 2023 06:00) (64 - 89)  BP: 130/86 (12 Oct 2023 06:00) (115/68 - 150/76)  BP(mean): 79 (11 Oct 2023 18:00) (66 - 94)  RR: 18 (12 Oct 2023 06:00) (13 - 18)  SpO2: 100% (12 Oct 2023 06:00) (94% - 100%)    Parameters below as of 12 Oct 2023 06:00  Patient On (Oxygen Delivery Method): nasal cannula  O2 Flow (L/min): 2        NAD, lying in bed  Breathing comfortably on room air, no stridor, stertor  OC/OP: no erythema, bleeding, lacerations; dentition same as prior to surgery  Neck flat and supple; no induration or collection  Incision c/d/i    A/P:   S/p total thyroid. IRINA output 100. Tolerating diet. PO tolerated.      - fu IRINA  -synthyroid  -diet OK  -pain control
    INTERVAL HX:    S/p total thyroid. Tolerating diet. PO tolerated.      Vital Signs Last 24 Hrs  T(C): 36.7 (14 Oct 2023 10:25), Max: 37.4 (13 Oct 2023 14:00)  T(F): 98 (14 Oct 2023 10:25), Max: 99.4 (13 Oct 2023 18:00)  HR: 86 (14 Oct 2023 10:25) (74 - 91)  BP: 158/82 (14 Oct 2023 10:25) (121/73 - 158/82)  BP(mean): --  RR: 17 (14 Oct 2023 10:25) (17 - 18)  SpO2: 95% (14 Oct 2023 10:25) (93% - 98%)    Parameters below as of 14 Oct 2023 10:25  Patient On (Oxygen Delivery Method): room air    NAD, lying in bed  Breathing comfortably on room air, no stridor, stertor  OC/OP: no erythema, bleeding, lacerations; dentition same as prior to surgery  Neck flat and supple; no induration or collection  Incision c/d/i    A/P:   S/p total thyroid. IRINA output 30 cc over 8 hrs, downtrending though still too high to remove. Tolerating diet. PO tolerated.      - fu IRINA - possible dc tomorrow (not comfortable leaving with IRINA in)  -synthyroid  -diet OK  -pain control
    INTERVAL HX:    S/p total thyroid. Tolerating diet. PO tolerated.      Vital Signs Last 24 Hrs  T(C): 36.7 (14 Oct 2023 10:25), Max: 37.4 (13 Oct 2023 14:00)  T(F): 98 (14 Oct 2023 10:25), Max: 99.4 (13 Oct 2023 18:00)  HR: 86 (14 Oct 2023 10:25) (74 - 91)  BP: 158/82 (14 Oct 2023 10:25) (121/73 - 158/82)  BP(mean): --  RR: 17 (14 Oct 2023 10:25) (17 - 18)  SpO2: 95% (14 Oct 2023 10:25) (93% - 98%)    Parameters below as of 14 Oct 2023 10:25  Patient On (Oxygen Delivery Method): room air    NAD, lying in bed  Breathing comfortably on room air, no stridor, stertor  OC/OP: no erythema, bleeding, lacerations; dentition same as prior to surgery  Neck flat and supple; no induration or collection  Incision c/d/i    A/P:   S/p total thyroid. IRINA output 55. Tolerating diet. PO tolerated.      - fu IRINA  -synthyroid  -diet OK  -pain control
    INTERVAL HX:    S/p total thyroid. Tolerating diet. PO tolerated.      Vital Signs Last 24 Hrs  T(C): 36.7 (16 Oct 2023 05:15), Max: 37.2 (15 Oct 2023 14:00)  T(F): 98.1 (16 Oct 2023 05:15), Max: 98.9 (15 Oct 2023 14:00)  HR: 70 (16 Oct 2023 05:15) (70 - 85)  BP: 139/80 (16 Oct 2023 05:15) (127/62 - 151/73)  BP(mean): --  RR: 18 (16 Oct 2023 05:15) (17 - 18)  SpO2: 94% (16 Oct 2023 05:15) (91% - 95%)    Parameters below as of 16 Oct 2023 05:15  Patient On (Oxygen Delivery Method): room air        NAD, lying in bed  Breathing comfortably on room air, no stridor, stertor  OC/OP: no erythema, bleeding, lacerations; dentition same as prior to surgery  Neck flat and supple; no induration or collection  Incision c/d/i    A/P:   S/p total thyroid. IRINA output 20 cc. Tolerating diet. PO tolerated.      - fu IRINA - possible dc today  -synthyroid  -diet OK  -pain control

## 2023-10-16 NOTE — DISCHARGE NOTE NURSING/CASE MANAGEMENT/SOCIAL WORK - PATIENT PORTAL LINK FT
You can access the FollowMyHealth Patient Portal offered by [facility] by registering at the following website: [email or web address]. By joining [facility]’s FollowMyHealth portal, you will also be able to view your health information using other applications (apps) compatible with our system.

## 2023-10-16 NOTE — DISCHARGE NOTE NURSING/CASE MANAGEMENT/SOCIAL WORK - NSDCVIVACCINE_GEN_ALL_CORE_FT
influenza, injectable, quadrivalent, preservative free; 07-Oct-2014 10:39; Jairon Gold (RN); [employer]; [id number]; IntraMuscular; Deltoid Left.; 0.5 milliLiter(s);   influenza, injectable, quadrivalent, preservative free; 31-Jan-2016 14:26; Kalli Mcdowell (RN); [employer]; [id number]; IntraMuscular; Deltoid Right.; 0.5 milliLiter(s); VIS (VIS Published: 07-Aug-2015, VIS Presented: 31-Jan-2016);

## 2023-10-25 LAB
SURGICAL PATHOLOGY STUDY: SIGNIFICANT CHANGE UP
SURGICAL PATHOLOGY STUDY: SIGNIFICANT CHANGE UP

## 2023-11-01 ENCOUNTER — NON-APPOINTMENT (OUTPATIENT)
Age: 63
End: 2023-11-01

## 2023-11-02 ENCOUNTER — APPOINTMENT (OUTPATIENT)
Dept: OTOLARYNGOLOGY | Facility: CLINIC | Age: 63
End: 2023-11-02
Payer: COMMERCIAL

## 2023-11-02 VITALS
HEIGHT: 67.5 IN | HEART RATE: 69 BPM | OXYGEN SATURATION: 97 % | WEIGHT: 293 LBS | SYSTOLIC BLOOD PRESSURE: 138 MMHG | DIASTOLIC BLOOD PRESSURE: 91 MMHG | RESPIRATION RATE: 19 BRPM | BODY MASS INDEX: 45.45 KG/M2

## 2023-11-02 DIAGNOSIS — E04.0 NONTOXIC DIFFUSE GOITER: ICD-10-CM

## 2023-11-02 DIAGNOSIS — Z86.79 PERSONAL HISTORY OF OTHER DISEASES OF THE CIRCULATORY SYSTEM: ICD-10-CM

## 2023-11-02 PROCEDURE — 99024 POSTOP FOLLOW-UP VISIT: CPT

## 2023-11-02 RX ORDER — LOSARTAN POTASSIUM 100 MG/1
TABLET, FILM COATED ORAL
Refills: 0 | Status: ACTIVE | COMMUNITY

## 2023-11-17 RX ORDER — LEVOTHYROXINE SODIUM 200 UG/1
200 CAPSULE ORAL
Refills: 0 | Status: COMPLETED | COMMUNITY
End: 2023-11-17

## 2024-03-29 ENCOUNTER — RX RENEWAL (OUTPATIENT)
Age: 64
End: 2024-03-29

## 2024-05-16 ENCOUNTER — APPOINTMENT (OUTPATIENT)
Dept: OTOLARYNGOLOGY | Facility: CLINIC | Age: 64
End: 2024-05-16
Payer: COMMERCIAL

## 2024-05-16 VITALS
SYSTOLIC BLOOD PRESSURE: 115 MMHG | BODY MASS INDEX: 45.52 KG/M2 | HEART RATE: 79 BPM | WEIGHT: 290 LBS | HEIGHT: 67 IN | DIASTOLIC BLOOD PRESSURE: 69 MMHG | OXYGEN SATURATION: 96 %

## 2024-05-16 DIAGNOSIS — E04.9 NONTOXIC GOITER, UNSPECIFIED: ICD-10-CM

## 2024-05-16 PROCEDURE — 99212 OFFICE O/P EST SF 10 MIN: CPT

## 2024-05-16 RX ORDER — LEVOTHYROXINE SODIUM 0.09 MG/1
88 TABLET ORAL
Refills: 0 | Status: ACTIVE | COMMUNITY

## 2024-05-16 RX ORDER — LEVOTHYROXINE SODIUM 0.1 MG/1
100 TABLET ORAL
Refills: 0 | Status: ACTIVE | COMMUNITY

## 2024-05-16 RX ORDER — LEVOTHYROXINE SODIUM 0.2 MG/1
200 TABLET ORAL DAILY
Qty: 30 | Refills: 0 | Status: COMPLETED | COMMUNITY
Start: 2023-11-17 | End: 2024-05-16

## 2024-05-16 NOTE — HISTORY OF PRESENT ILLNESS
[de-identified] : 63 year old woman with COPD referred by Dr. Gomez for surgical eval of thyroid goiter. This was found recently on CT chest done June 2023. Patient is now s/p Excision of substernal goiter 10/11/23 Final Path revealed Adenomatoid nodule (Thyroid follicular nodular disease) Reports she has been doing well since last clinic visit, no new issues or complaints.  Eating and drinking without any difficulty. Reports intermittent globus sensation and intermittent vocal hoarseness--could be mucous but she is unsure. Denies complete loss of voice or mas and pain while speaking  Patient denies throat/neck pain, dysphagia, odynophagia, dyspnea, hemoptysis or otalgia. Denies fever, chills, night sweats or weight loss.

## 2024-05-16 NOTE — REASON FOR VISIT
[Subsequent Evaluation] : a subsequent evaluation for [Other: _____] : [unfilled] [FreeTextEntry2] : thyroid goiter

## 2024-05-16 NOTE — PHYSICAL EXAM
[Midline] : trachea located in midline position [Normal] : no rashes [de-identified] : Incision C/D/I.

## 2024-05-16 NOTE — CONSULT LETTER
[Dear  ___] : Dear  [unfilled], [Courtesy Letter:] : I had the pleasure of seeing your patient, [unfilled], in my office today. [Please see my note below.] : Please see my note below. [Consult Closing:] : Thank you very much for allowing me to participate in the care of this patient.  If you have any questions, please do not hesitate to contact me. [Sincerely,] : Sincerely, [FreeTextEntry2] : Immanuel Nunez MD  [FreeTextEntry3] : Efra Ball MD, FACS  Otolaryngology-Head and Neck Surgery [employer] [facility]

## 2024-05-16 NOTE — PLAN
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 - CT neck showed narrowing of the trachea and patient having more MIRZA recently and neck pressure. - US in the office showed an isoechoic nodule with cystic areas. - Discussed findings and recommended total thyroidectomy with possible sternal split. - S/P total thyroidectomy on 10/11/23. Healing well. VC mobile bilaterally.  - On Synthroid and following up with her endocrinologist. - Return as needed.

## 2024-05-30 ENCOUNTER — RX RENEWAL (OUTPATIENT)
Age: 64
End: 2024-05-30

## 2024-05-30 RX ORDER — BUDESONIDE, GLYCOPYRROLATE, AND FORMOTEROL FUMARATE 160; 9; 4.8 UG/1; UG/1; UG/1
160-9-4.8 AEROSOL, METERED RESPIRATORY (INHALATION)
Qty: 1 | Refills: 0 | Status: ACTIVE | COMMUNITY
Start: 2023-05-25 | End: 1900-01-01

## 2024-07-11 ENCOUNTER — RX RENEWAL (OUTPATIENT)
Age: 64
End: 2024-07-11

## 2024-07-25 ENCOUNTER — RX RENEWAL (OUTPATIENT)
Age: 64
End: 2024-07-25

## 2024-10-09 ENCOUNTER — APPOINTMENT (OUTPATIENT)
Dept: PULMONOLOGY | Facility: CLINIC | Age: 64
End: 2024-10-09
Payer: COMMERCIAL

## 2024-10-09 ENCOUNTER — NON-APPOINTMENT (OUTPATIENT)
Age: 64
End: 2024-10-09

## 2024-10-09 VITALS
TEMPERATURE: 97.8 F | SYSTOLIC BLOOD PRESSURE: 138 MMHG | BODY MASS INDEX: 50.12 KG/M2 | OXYGEN SATURATION: 92 % | HEART RATE: 83 BPM | DIASTOLIC BLOOD PRESSURE: 98 MMHG | WEIGHT: 293 LBS

## 2024-10-09 DIAGNOSIS — J45.909 UNSPECIFIED ASTHMA, UNCOMPLICATED: ICD-10-CM

## 2024-10-09 DIAGNOSIS — J44.9 CHRONIC OBSTRUCTIVE PULMONARY DISEASE, UNSPECIFIED: ICD-10-CM

## 2024-10-09 PROCEDURE — 99214 OFFICE O/P EST MOD 30 MIN: CPT

## 2025-04-07 ENCOUNTER — APPOINTMENT (OUTPATIENT)
Dept: PULMONOLOGY | Facility: CLINIC | Age: 65
End: 2025-04-07
Payer: MEDICAID

## 2025-04-07 VITALS
BODY MASS INDEX: 45.99 KG/M2 | HEART RATE: 82 BPM | RESPIRATION RATE: 18 BRPM | DIASTOLIC BLOOD PRESSURE: 72 MMHG | TEMPERATURE: 98.1 F | OXYGEN SATURATION: 97 % | SYSTOLIC BLOOD PRESSURE: 142 MMHG | WEIGHT: 293 LBS | HEIGHT: 67 IN

## 2025-04-07 DIAGNOSIS — J44.9 CHRONIC OBSTRUCTIVE PULMONARY DISEASE, UNSPECIFIED: ICD-10-CM

## 2025-04-07 DIAGNOSIS — G47.30 SLEEP APNEA, UNSPECIFIED: ICD-10-CM

## 2025-04-07 DIAGNOSIS — R06.02 SHORTNESS OF BREATH: ICD-10-CM

## 2025-04-07 DIAGNOSIS — J45.909 UNSPECIFIED ASTHMA, UNCOMPLICATED: ICD-10-CM

## 2025-04-07 DIAGNOSIS — Z87.891 PERSONAL HISTORY OF NICOTINE DEPENDENCE: ICD-10-CM

## 2025-04-07 PROCEDURE — 99214 OFFICE O/P EST MOD 30 MIN: CPT

## 2025-04-17 ENCOUNTER — NON-APPOINTMENT (OUTPATIENT)
Age: 65
End: 2025-04-17

## 2025-04-24 ENCOUNTER — NON-APPOINTMENT (OUTPATIENT)
Age: 65
End: 2025-04-24

## 2025-04-25 ENCOUNTER — APPOINTMENT (OUTPATIENT)
Dept: CT IMAGING | Facility: CLINIC | Age: 65
End: 2025-04-25

## (undated) DEVICE — VENODYNE/SCD SLEEVE CALF MEDIUM

## (undated) DEVICE — DRAIN BLAKE 10FR ROUND

## (undated) DEVICE — LONE STAR RETRACTOR RING 12MM BLUNT DISP

## (undated) DEVICE — PACK HEAD & NECK

## (undated) DEVICE — DRAPE 3/4 SHEET 52X76"

## (undated) DEVICE — SOL IRR POUR H2O 500ML

## (undated) DEVICE — GLV 7.5 PROTEXIS (WHITE)

## (undated) DEVICE — CANISTER DISPOSABLE THIN WALL 3000CC

## (undated) DEVICE — DRSG MASTISOL

## (undated) DEVICE — ELCTR BOVIE PENCIL SMOKE EVACUATION

## (undated) DEVICE — ELCTR THUNDERBEAT HANDPIECE 0MM X 9CM OPEN FINE JAW

## (undated) DEVICE — STAPLER SKIN VISI-STAT 35 WIDE

## (undated) DEVICE — DRSG CURITY GAUZE SPONGE 4 X 4" 12-PLY

## (undated) DEVICE — DRAPE MAGNETIC INSTRUMENT MEDIUM

## (undated) DEVICE — DRAIN RESERVOIR FOR JACKSON PRATT 100CC CARDINAL

## (undated) DEVICE — LABELS BLANK W PEN

## (undated) DEVICE — DRAPE SPLIT SHEET 77" X 120"

## (undated) DEVICE — SUT SILK 2-0 30" SH

## (undated) DEVICE — SPONGE PEANUT AUTO COUNT

## (undated) DEVICE — VISITEC 4X4

## (undated) DEVICE — LIGASURE SMALL JAW

## (undated) DEVICE — DRAPE TOWEL BLUE 17" X 24"

## (undated) DEVICE — SUT MONOCRYL 4-0 27" PS-2 UNDYED

## (undated) DEVICE — ELCTR GROUNDING PAD ADULT COVIDIEN

## (undated) DEVICE — SUT SILK 2-0 18" TIES

## (undated) DEVICE — WARMING BLANKET LOWER ADULT

## (undated) DEVICE — DRSG STERISTRIPS 0.5 X 4"

## (undated) DEVICE — BIPOLAR FORCEP KIRWAN JEWELERS STR 4" X 0.4MM W 12FT CORD (GREEN)

## (undated) DEVICE — SUT VICRYL 3-0 27" SH UNDYED

## (undated) DEVICE — SUT SILK 2-0 18" FS